# Patient Record
Sex: FEMALE | Race: WHITE | Employment: UNEMPLOYED | URBAN - METROPOLITAN AREA
[De-identification: names, ages, dates, MRNs, and addresses within clinical notes are randomized per-mention and may not be internally consistent; named-entity substitution may affect disease eponyms.]

---

## 2017-09-25 PROBLEM — E66.01 MORBID OBESITY WITH BMI OF 45.0-49.9, ADULT (HCC): Status: RESOLVED | Noted: 2017-03-22 | Resolved: 2017-09-25

## 2017-09-25 PROBLEM — K21.9 GASTROESOPHAGEAL REFLUX DISEASE: Status: ACTIVE | Noted: 2017-09-25

## 2017-09-25 PROBLEM — E66.01 MORBID OBESITY WITH BMI OF 50.0-59.9, ADULT (HCC): Status: ACTIVE | Noted: 2017-09-25

## 2018-07-26 DIAGNOSIS — F41.9 ANXIETY: ICD-10-CM

## 2018-07-27 RX ORDER — SERTRALINE HYDROCHLORIDE 25 MG/1
TABLET, FILM COATED ORAL
Qty: 30 TABLET | Refills: 5 | Status: SHIPPED | OUTPATIENT
Start: 2018-07-27 | End: 2018-10-29 | Stop reason: SDUPTHER

## 2018-10-23 ENCOUNTER — TELEPHONE (OUTPATIENT)
Dept: FAMILY MEDICINE CLINIC | Age: 38
End: 2018-10-23

## 2018-10-29 ENCOUNTER — OFFICE VISIT (OUTPATIENT)
Dept: FAMILY MEDICINE CLINIC | Age: 38
End: 2018-10-29
Payer: COMMERCIAL

## 2018-10-29 VITALS
SYSTOLIC BLOOD PRESSURE: 128 MMHG | HEIGHT: 67 IN | OXYGEN SATURATION: 96 % | WEIGHT: 293 LBS | HEART RATE: 76 BPM | BODY MASS INDEX: 45.99 KG/M2 | TEMPERATURE: 98 F | DIASTOLIC BLOOD PRESSURE: 86 MMHG

## 2018-10-29 DIAGNOSIS — K21.9 GASTROESOPHAGEAL REFLUX DISEASE, ESOPHAGITIS PRESENCE NOT SPECIFIED: ICD-10-CM

## 2018-10-29 DIAGNOSIS — F41.9 ANXIETY: Primary | ICD-10-CM

## 2018-10-29 PROCEDURE — 99213 OFFICE O/P EST LOW 20 MIN: CPT | Performed by: FAMILY MEDICINE

## 2018-10-29 PROCEDURE — 1036F TOBACCO NON-USER: CPT | Performed by: FAMILY MEDICINE

## 2018-10-29 PROCEDURE — G8484 FLU IMMUNIZE NO ADMIN: HCPCS | Performed by: FAMILY MEDICINE

## 2018-10-29 PROCEDURE — G8427 DOCREV CUR MEDS BY ELIG CLIN: HCPCS | Performed by: FAMILY MEDICINE

## 2018-10-29 PROCEDURE — G8417 CALC BMI ABV UP PARAM F/U: HCPCS | Performed by: FAMILY MEDICINE

## 2018-10-29 RX ORDER — SERTRALINE HYDROCHLORIDE 25 MG/1
TABLET, FILM COATED ORAL
Qty: 30 TABLET | Refills: 6 | Status: SHIPPED | OUTPATIENT
Start: 2018-10-29 | End: 2020-03-06 | Stop reason: SDUPTHER

## 2018-10-29 ASSESSMENT — PATIENT HEALTH QUESTIONNAIRE - PHQ9
1. LITTLE INTEREST OR PLEASURE IN DOING THINGS: 0
SUM OF ALL RESPONSES TO PHQ QUESTIONS 1-9: 1
SUM OF ALL RESPONSES TO PHQ9 QUESTIONS 1 & 2: 1
SUM OF ALL RESPONSES TO PHQ QUESTIONS 1-9: 1
2. FEELING DOWN, DEPRESSED OR HOPELESS: 1

## 2019-05-13 ENCOUNTER — HOSPITAL ENCOUNTER (OUTPATIENT)
Age: 39
Discharge: HOME | End: 2019-05-15
Payer: COMMERCIAL

## 2019-05-13 ENCOUNTER — OFFICE VISIT (OUTPATIENT)
Dept: PRIMARY CARE CLINIC | Age: 39
End: 2019-05-13
Payer: COMMERCIAL

## 2019-05-13 VITALS
TEMPERATURE: 98.5 F | WEIGHT: 293 LBS | BODY MASS INDEX: 50.28 KG/M2 | HEART RATE: 79 BPM | DIASTOLIC BLOOD PRESSURE: 86 MMHG | SYSTOLIC BLOOD PRESSURE: 118 MMHG | OXYGEN SATURATION: 98 %

## 2019-05-13 VITALS
SYSTOLIC BLOOD PRESSURE: 128 MMHG | TEMPERATURE: 97.8 F | WEIGHT: 293 LBS | DIASTOLIC BLOOD PRESSURE: 84 MMHG | HEIGHT: 67 IN | BODY MASS INDEX: 45.99 KG/M2

## 2019-05-13 DIAGNOSIS — M06.09 RHEUMATOID ARTHRITIS OF MULTIPLE SITES WITH NEGATIVE RHEUMATOID FACTOR (HCC): Primary | ICD-10-CM

## 2019-05-13 DIAGNOSIS — Z01.84 IMMUNITY STATUS TESTING: ICD-10-CM

## 2019-05-13 DIAGNOSIS — Z13.31 POSITIVE DEPRESSION SCREENING: ICD-10-CM

## 2019-05-13 DIAGNOSIS — Z13.220 SCREENING CHOLESTEROL LEVEL: ICD-10-CM

## 2019-05-13 DIAGNOSIS — K43.9 VENTRAL HERNIA WITHOUT OBSTRUCTION OR GANGRENE: ICD-10-CM

## 2019-05-13 DIAGNOSIS — K21.9 GASTROESOPHAGEAL REFLUX DISEASE, ESOPHAGITIS PRESENCE NOT SPECIFIED: ICD-10-CM

## 2019-05-13 DIAGNOSIS — E66.01 MORBID OBESITY (HCC): ICD-10-CM

## 2019-05-13 DIAGNOSIS — F41.9 ANXIETY: ICD-10-CM

## 2019-05-13 DIAGNOSIS — Z72.9 PROBLEM RELATED TO LIFESTYLE, UNSPECIFIED: ICD-10-CM

## 2019-05-13 PROCEDURE — 99214 OFFICE O/P EST MOD 30 MIN: CPT | Performed by: FAMILY MEDICINE

## 2019-05-13 PROCEDURE — 86787 VARICELLA-ZOSTER ANTIBODY: CPT

## 2019-05-13 PROCEDURE — G0444 DEPRESSION SCREEN ANNUAL: HCPCS | Performed by: FAMILY MEDICINE

## 2019-05-13 PROCEDURE — G8431 POS CLIN DEPRES SCRN F/U DOC: HCPCS | Performed by: FAMILY MEDICINE

## 2019-05-13 PROCEDURE — G8417 CALC BMI ABV UP PARAM F/U: HCPCS | Performed by: FAMILY MEDICINE

## 2019-05-13 PROCEDURE — G8427 DOCREV CUR MEDS BY ELIG CLIN: HCPCS | Performed by: FAMILY MEDICINE

## 2019-05-13 PROCEDURE — 36415 COLL VENOUS BLD VENIPUNCTURE: CPT

## 2019-05-13 PROCEDURE — 1036F TOBACCO NON-USER: CPT | Performed by: FAMILY MEDICINE

## 2019-05-13 RX ORDER — MELOXICAM 7.5 MG/1
7.5 TABLET ORAL
COMMUNITY
End: 2019-05-13 | Stop reason: ALTCHOICE

## 2019-05-13 ASSESSMENT — ENCOUNTER SYMPTOMS
COUGH: 0
CONSTIPATION: 0
BACK PAIN: 0
EYE REDNESS: 0
SHORTNESS OF BREATH: 0
ABDOMINAL PAIN: 1
WHEEZING: 0
RHINORRHEA: 0
DIARRHEA: 0
PHOTOPHOBIA: 0
SORE THROAT: 0
BLOOD IN STOOL: 0
NAUSEA: 0
VOMITING: 0

## 2019-05-13 ASSESSMENT — PATIENT HEALTH QUESTIONNAIRE - PHQ9
5. POOR APPETITE OR OVEREATING: 1
8. MOVING OR SPEAKING SO SLOWLY THAT OTHER PEOPLE COULD HAVE NOTICED. OR THE OPPOSITE, BEING SO FIGETY OR RESTLESS THAT YOU HAVE BEEN MOVING AROUND A LOT MORE THAN USUAL: 0
SUM OF ALL RESPONSES TO PHQ9 QUESTIONS 1 & 2: 4
1. LITTLE INTEREST OR PLEASURE IN DOING THINGS: 3
7. TROUBLE CONCENTRATING ON THINGS, SUCH AS READING THE NEWSPAPER OR WATCHING TELEVISION: 0
10. IF YOU CHECKED OFF ANY PROBLEMS, HOW DIFFICULT HAVE THESE PROBLEMS MADE IT FOR YOU TO DO YOUR WORK, TAKE CARE OF THINGS AT HOME, OR GET ALONG WITH OTHER PEOPLE: 0
3. TROUBLE FALLING OR STAYING ASLEEP: 3
6. FEELING BAD ABOUT YOURSELF - OR THAT YOU ARE A FAILURE OR HAVE LET YOURSELF OR YOUR FAMILY DOWN: 3
SUM OF ALL RESPONSES TO PHQ QUESTIONS 1-9: 12
4. FEELING TIRED OR HAVING LITTLE ENERGY: 1
2. FEELING DOWN, DEPRESSED OR HOPELESS: 1
9. THOUGHTS THAT YOU WOULD BE BETTER OFF DEAD, OR OF HURTING YOURSELF: 0
SUM OF ALL RESPONSES TO PHQ QUESTIONS 1-9: 12

## 2019-05-13 NOTE — PROGRESS NOTES
2019    Chief Complaint   Patient presents with    Annual Exam     dx with RA 1yr ago         HPI  Radha Clemons (:  1980) is a 45 y.o. female, here for evaluation of the following medical concerns:    Patient is a 29-year-old female with a past medical history of anxiety, GERD, obesity, rheumatoid arthritis    RA: Patient follows with rheumatology, Dr. Tami Lee. Patient is currently on plaquenil and has regular eye exams due to being on this medication. RF:49 and . Reports that her symptoms are under moderately well controlled with her current medication regimen. Anxiety: Patient has been well controlled on Zoloft 25 mg daily. Patient has any SI/HI. Does not follow with a therapist. Higinio Kevin through a divorce. To do this report some increasing anxiety/depression. GERD: No longer take omeprazole. Avoiding her triggers. No worsening of her GERD symptoms. Obesity: Patient does not regularly follow a diet or exercise regimen. Requesting referral to bariatric's. Does report that she has been walking more regularly and lost a few pounds over the last few weeks. GYN/OB: Needs a new ob/gyn physician. PAP smear about a year and a half ago. No hx of ab on PAP. Ventral Hernia: Reports mild pain around her umbillicus at times. Was told in the past she does have a hernia. Requesting add work-up/mngt. HM: Pap smear due?, Vz immunization due, HIV screening due. No recent lipid panel    Review of Systems   Constitutional: Negative for chills and fever. HENT: Negative for congestion, hearing loss, postnasal drip, rhinorrhea, sneezing, sore throat and tinnitus. Eyes: Negative for photophobia and redness. Respiratory: Negative for cough, shortness of breath and wheezing. Cardiovascular: Negative for chest pain, palpitations and leg swelling. Gastrointestinal: Positive for abdominal pain. Negative for blood in stool, constipation, diarrhea, nausea and vomiting.    Endocrine: Negative for polydipsia and polyuria. Genitourinary: Negative for difficulty urinating, dysuria, hematuria, vaginal bleeding and vaginal discharge. Musculoskeletal: Positive for arthralgias. Negative for back pain. Skin: Negative for rash. Neurological: Negative for dizziness, weakness, light-headedness, numbness and headaches. Psychiatric/Behavioral: Negative for suicidal ideas. The patient is nervous/anxious. Prior to Visit Medications    Medication Sig Taking? Authorizing Provider   sertraline (ZOLOFT) 25 MG tablet take 1 tablet by mouth once daily  Katie Lutz DO   hydroxychloroquine (PLAQUENIL) 200 MG tablet Take 200 mg by mouth 2 times daily  Historical Provider, MD   folic acid (FOLVITE) 1 MG tablet Take 1 mg by mouth daily  Historical Provider, MD   meloxicam (MOBIC) 15 MG tablet   Historical Provider, MD        Allergies   Allergen Reactions    Ceclor [Cefaclor]      UNKNOWN REACTION    Codeine Hives    Erythromycin Rash    Tylenol 8 Hour [Acetaminophen] Hives     Tylenol #3 with Codeine       Past Medical History:   Diagnosis Date    Anxiety     Depression     Hernia of abdominal cavity     [facility]     Rheumatoid arthritis ([facility] 2018        Menstrual History:  OB History    None          No LMP recorded.          Past Surgical History:   Procedure Laterality Date    ANKLE FRACTURE SURGERY      has plate and pins    CHOLECYSTECTOMY      TUBAL LIGATION         Family History   Problem Relation Age of Onset    Hypertension Mother        Immunization History   Administered Date(s) Administered    Influenza, Quadv, 3 yrs and older, IM, PF (Fluzone 3 yrs and older or Afluria 5 yrs and older) 09/25/2017    Tdap (Boostrix, Adacel) 10/25/2017       Health Maintenance   Topic Date Due    Varicella Vaccine (1 of 2 - 13+ 2-dose series) 09/29/1993    HIV screen  09/29/1995    Cervical cancer screen  04/27/2018    Flu vaccine (Season Ended) 09/01/2019    DTaP/Tdap/Td vaccine (2 - Td) 10/25/2027    Pneumococcal 0-64 years Vaccine  Aged Out       Social History     Socioeconomic History    Marital status:      Spouse name: Not on file    Number of children: Not on file    Years of education: Not on file    Highest education level: Not on file   Occupational History    Not on file   Social Needs    Financial resource strain: Not on file    Food insecurity:     Worry: Not on file     Inability: Not on file    Transportation needs:     Medical: Not on file     Non-medical: Not on file   Tobacco Use    Smoking status: Former Smoker     Last attempt to quit: 2013     Years since quittin.0    Smokeless tobacco: Never Used   Substance and Sexual Activity    Alcohol use: No    Drug use: No    Sexual activity: Not on file   Lifestyle    Physical activity:     Days per week: Not on file     Minutes per session: Not on file    Stress: Not on file   Relationships    Social connections:     Talks on phone: Not on file     Gets together: Not on file     Attends Muslim service: Not on file     Active member of club or organization: Not on file     Attends meetings of clubs or organizations: Not on file     Relationship status: Not on file    Intimate partner violence:     Fear of current or ex partner: Not on file     Emotionally abused: Not on file     Physically abused: Not on file     Forced sexual activity: Not on file   Other Topics Concern    Not on file   Social History Narrative    Not on file         Vitals:    19 1516   BP: 128/84   Temp: 97.8 °F (36.6 °C)   Weight: (!) 328 lb (148.8 kg)   Height: 5' 7\" (1.702 m)       Estimated body mass index is 51.37 kg/m² as calculated from the following:    Height as of this encounter: 5' 7\" (1.702 m). Weight as of this encounter: 328 lb (148.8 kg). Physical Exam   Constitutional: She is oriented to person, place, and time. She appears well-developed and well-nourished.    HENT: Head: Normocephalic. Right Ear: External ear normal.   Left Ear: External ear normal.   Mouth/Throat: Oropharynx is clear and moist. No oropharyngeal exudate. Eyes: Pupils are equal, round, and reactive to light. Conjunctivae and EOM are normal.   Neck: Trachea normal. Neck supple. No thyromegaly present. Cardiovascular: Normal rate, regular rhythm, normal heart sounds and intact distal pulses. No murmur heard. Pulses:       Radial pulses are 2+ on the right side, and 2+ on the left side. Posterior tibial pulses are 2+ on the right side, and 2+ on the left side. Pulmonary/Chest: Effort normal and breath sounds normal. No respiratory distress. She has no decreased breath sounds. She has no wheezes. She has no rales. Abdominal: Soft. Bowel sounds are normal. She exhibits no distension. There is tenderness in the periumbilical area. There is no rebound. Exam limited secondary to body habitus. Possible ventral hernia versus diastasis recti. Musculoskeletal: Normal range of motion. She exhibits no edema. Bilateral lower extremity mild lymphedema   Lymphadenopathy:     She has no cervical adenopathy. Neurological: She is alert and oriented to person, place, and time. She has normal strength. No cranial nerve deficit or sensory deficit. Reflex Scores:       Patellar reflexes are 1+ on the right side and 1+ on the left side. Skin: Skin is warm and dry. No rash noted. Psychiatric: She has a normal mood and affect. Her behavior is normal.   Nursing note and vitals reviewed.       Patient Active Problem List    Diagnosis Date Noted    Gastroesophageal reflux disease 10/29/2018    Morbid obesity with BMI of 50.0-59.9, adult ([facility] [address] 09/25/2017    Gastroesophageal reflux disease 09/25/2017    Rheumatoid arthritis of multiple sites with negative rheumatoid factor ([facility] [address] [facility] 03/22/2017    Elevated uric acid in blood 09/19/2016    PRUDENCIO positive 09/19/2016    Anxiety          ASSESSMENT/PLAN:  [facility] was [facility] today for annual exam.    Diagnoses and all orders for this visit:    Rheumatoid arthritis of multiple sites with negative rheumatoid factor [facility])  Patient to rheumatology. Continue current regimen. Anxiety  Patient reports mild worsening of her symptoms secondary to a ongoing divorce. No SI/HI. Patient tolerating her current medication well. No desire to increase her dosage at this time. Gastroesophageal reflux disease, esophagitis presence not specified  Controlled via diet. Not currently on any medications for such. Positive depression screening  -     Positive Screen for Clinical Depression with a Documented Follow-up Plan   Treatment as noted above. Morbid obesity (HCC)  -     Nidhi Abel MD, [facility]  -     TSH without Reflex; Future  -     Lipid Panel; Future  -     Hemoglobin A1C; Future  Discussed lifestyle modifications. Referral and lipids as noted above. Screening cholesterol level  -     Lipid Panel; Future    Ventral hernia without obstruction or gangrene  -     US ABDOMEN LIMITED; Future  -     [address], MD, General Surgery, [facility]  Recent worsening symptoms with mild pain at times. Check ultrasound. Consider possible CT if no findings on ultrasound. Follow up with surgery. Immunity status testing  -     Varicella Zoster Antibody, IgG; Future  -     Varicella Zoster Antibody, IgM; Future  In the patient's health maintenance issues there is suggestion that she is not immune to varicella. We'll check as noted above. If titers are low we will proceed with varicella immunization. Problem related to lifestyle, unspecified  -     Jeri Quiroz MD, OB/GYN, [facility]  Referral to [facility] care for routine Pap smears and future mammograms. Health Maintenance reviewed     Return in about 3 months (around 8/13/2019).       Educational materials and/or home exercises printed for patient's review and were included in patient instructions on his/her After Visit Summary and given to patient at the end of visit.       Counseled regarding above diagnosis, including possible risks and complications,  especially if left uncontrolled.     Counseled regarding the possible side effects, risks, benefits and alternatives to treatment; patient and/or guardianverbalizes understanding, agrees, feels comfortable with and wishes to proceed with above treatment plan.     Advised patient to call with any new medication issues, and read all Rx info from pharmacy to assure aware of all possible risks and side effects of medication before taking.     Reviewed age and gender appropriate health screening exams and vaccinations. Advised patient regarding importance of keeping up withrecommended health maintenance and to schedule as soon as possible if overdue, as this is important in assessing for undiagnosed pathology, especially cancer, as well as protecting against potentially harmful/lifethreatening disease.       Patient and/or guardian verbalizes understanding and agrees with abovecounseling, assessment and plan.     All questions answered. An  electronic signature was used to authenticatethis note. --Lurdes Mireles MD on 5/13/19 at 3:12 PM    On the basis of positive PHQ-9 screening (PHQ-9 Total Score: 12), the following plan was implemented: Patient already on medication and seeing therapist.  Patient will follow-up in 3 month(s) with PCP.

## 2019-05-14 ENCOUNTER — TELEPHONE (OUTPATIENT)
Dept: SURGERY | Age: 39
End: 2019-05-14

## 2019-05-14 LAB — VARICELLA-ZOSTER VIRUS AB, IGG: NORMAL

## 2019-05-16 ENCOUNTER — TELEPHONE (OUTPATIENT)
Dept: PRIMARY CARE CLINIC | Age: 39
End: 2019-05-16

## 2019-05-16 LAB — VARICELLA ZOSTER AB IGM: 0.43 ISR

## 2019-05-16 NOTE — TELEPHONE ENCOUNTER
----- Message from Crissy Calderón MD sent at 5/15/2019  8:18 AM EDT -----  Please notify the patient that she is immune to chickenpox.

## 2019-05-16 NOTE — TELEPHONE ENCOUNTER
----- Message from Kristian Slater MD sent at 5/15/2019  8:18 AM EDT -----  Please notify the patient that she is immune to chickenpox.

## 2019-05-31 ENCOUNTER — OFFICE VISIT (OUTPATIENT)
Dept: SURGERY | Age: 39
End: 2019-05-31
Payer: COMMERCIAL

## 2019-05-31 ENCOUNTER — TELEPHONE (OUTPATIENT)
Dept: SURGERY | Age: 39
End: 2019-05-31

## 2019-05-31 VITALS
HEART RATE: 65 BPM | OXYGEN SATURATION: 93 % | WEIGHT: 293 LBS | HEIGHT: 67 IN | BODY MASS INDEX: 45.99 KG/M2 | DIASTOLIC BLOOD PRESSURE: 70 MMHG | TEMPERATURE: 98.4 F | SYSTOLIC BLOOD PRESSURE: 130 MMHG | RESPIRATION RATE: 14 BRPM

## 2019-05-31 DIAGNOSIS — K43.9 EPIGASTRIC HERNIA: Primary | ICD-10-CM

## 2019-05-31 PROCEDURE — 1036F TOBACCO NON-USER: CPT | Performed by: SURGERY

## 2019-05-31 PROCEDURE — 99203 OFFICE O/P NEW LOW 30 MIN: CPT | Performed by: SURGERY

## 2019-05-31 PROCEDURE — G8417 CALC BMI ABV UP PARAM F/U: HCPCS | Performed by: SURGERY

## 2019-05-31 PROCEDURE — G8427 DOCREV CUR MEDS BY ELIG CLIN: HCPCS | Performed by: SURGERY

## 2019-05-31 NOTE — PROGRESS NOTES
[facility] Note    Assessment/Plan:      Diagnosis Orders   1. Epigastric hernia  CT ABDOMEN PELVIS W IV CONTRAST Additional Contrast? Oral    BASIC METABOLIC PANEL         Return for Results. Chief Complaint   Patient presents with    Hernia     ventrial hernia referred by Dr. Ronal Ferrer. states it is located on the abdomen area. states shes had it for years. pt states she has pain. pt states she is eating and drinking okay. PCP: Haven Hughes MD    HPI: Savanah Gomez is a 45 y.o. female who presents in consultation for hernia. She has had this for a few years. It is in the epigastrium. It does cause some sharp pain 3-4x daily. . She had an ultrasound which showed a hernia about 2 cm above the umbilicus. There is also evidence for diastases recti. We discussed with her BMI being greater than 52, she is at very high risk for complications as well as a high risk of hernia recurrence. Past Medical History:   Diagnosis Date    Anxiety     Depression     Hernia of abdominal cavity     [facility] ([facility] [address] [facility]     Rheumatoid arthritis ([facility] [address] 2018       Past Surgical History:   Procedure Laterality Date    ANKLE FRACTURE SURGERY      has plate and pins    CHOLECYSTECTOMY      TUBAL LIGATION         Prior to Admission medications    Medication Sig Start Date End Date Taking?  Authorizing Provider   sertraline (ZOLOFT) 25 MG tablet take 1 tablet by mouth once daily 10/29/18  Yes Zay Galvan DO   hydroxychloroquine (PLAQUENIL) 200 MG tablet Take 200 mg by mouth 2 times daily   Yes Historical Provider, MD   folic acid (FOLVITE) 1 MG tablet Take 1 mg by mouth daily   Yes Historical Provider, MD   meloxicam (MOBIC) 15 MG tablet  1/17/17  Yes Historical Provider, MD       Allergies   Allergen Reactions    Ceclor [Cefaclor]      UNKNOWN REACTION    Codeine Hives    Erythromycin Rash    Tylenol 8 Hour [Acetaminophen] Hives     Tylenol #3 with Codeine       Social No respiratory distress. Abdominal: Soft. She exhibits no distension. There is no tenderness. There is no rebound and no guarding. Morbidly obese. Unable to appreciate hernia due to habitus. Neurological: She is alert and oriented to person, place, and time. Skin: Skin is warm and dry. Kiana Llaons MD  6/3/2019    NOTE: This report, in part or full,may have been transcribed using voice recognition software. Every effort was made to ensure accuracy; however, inadvertent computerized transcription errors may be present. Please excuse any transcriptional grammatical or spelling errors that may have escaped my editorial review.     CC: Ru Esparza MD

## 2019-05-31 NOTE — TELEPHONE ENCOUNTER
MA called [facility] to get CT of the abdomen/pelvis w/contrast approved. CPT code 52181. MA talked to a Olivia Cardona. And he stated that a prior authorization is needed for the CT of the abdomen/pelvis w/contrast. Lina STEINER Stated that he needed clinicals/notes pertaining to the CT scan faxed to [phone] to the [facility] department.     Case number: [id number]    Electronically signed by Blanche Villegas on 5/31/19 at 1:43 PM

## 2019-06-03 ENCOUNTER — TELEPHONE (OUTPATIENT)
Dept: SURGERY | Age: 39
End: 2019-06-03

## 2019-06-03 NOTE — TELEPHONE ENCOUNTER
MA called [facility] to check on the clinicals/notes that were sent over to them if they have received them. MA talked to lb CATES [employer] and she stated she needed the exact area of where the hernia was located. MA stated in the notes it said \"above the umbilicus. \" Eva Romero Stated that they can approve a CT abdomen/pelvis w/contrast or a CT Abdomen/pelvis w/o contrast but they cannot approve a CT of abdomen in the pelvis. Kira CATES Stated that the prior authorization should be done by 6/4/19. We would get an automatic fax from [employer] giving us an answer on whether it was approved or not. Kira CATES Advised we get a decision on redirection from Dr. Sera Horn and to let them know what Dr. Sera Horn stated.     Electronically signed by Angel Navarrete on 6/3/19 at 3:21 PM

## 2019-06-04 ENCOUNTER — TELEPHONE (OUTPATIENT)
Dept: SURGERY | Age: 39
End: 2019-06-04

## 2019-06-04 NOTE — TELEPHONE ENCOUNTER
MA called the pt to schedule CT of the abdomen/pelvis with IV contrast and oral and to also get a BMP drawn for blood work that Dr. Lexus Wells had ordered. Pt stated that any day the week of the 10th of June or Thursday June 6th or Friday June 7th would work for her. MA called Radiology scheduling and put the pt on for June 6th at 3:30 pm. MA called the pt back to give the appointment time for the CT and blood work. MA expllained to the pt NPO four hours prior to the CT and to arrive at the [facility] in [facility] before 1:00 or 1:30 to get the BMP drawn and to get over to Registration for the CT before 2:30. MA explained to the pt she can take her regular medications with water and for her to bring photo id, ins card, and list of medications. MA also made a follow up appointment for the CT scan and blood work results for Friday June 14th at 10:45 am at the [place] office location.     Electronically signed by Henry Rodriguez on 6/4/19 at 10:32 AM

## 2019-06-10 ENCOUNTER — TELEPHONE (OUTPATIENT)
Dept: SURGERY | Age: 39
End: 2019-06-10

## 2019-06-10 NOTE — TELEPHONE ENCOUNTER
CARMENCITA Reagan tried 2 times to call patient to see why she no showed for her CT appt and if she wanted to reschedule, however, her phone number was not working.     Electronically signed by Moe Escalante MA on 6/10/2019 at 1:30 PM

## 2019-11-18 ENCOUNTER — OFFICE VISIT (OUTPATIENT)
Dept: FAMILY MEDICINE CLINIC | Age: 39
End: 2019-11-18
Payer: MEDICAID

## 2019-11-18 VITALS
TEMPERATURE: 98.5 F | OXYGEN SATURATION: 98 % | HEART RATE: 80 BPM | SYSTOLIC BLOOD PRESSURE: 130 MMHG | WEIGHT: 293 LBS | BODY MASS INDEX: 45.99 KG/M2 | HEIGHT: 67 IN | DIASTOLIC BLOOD PRESSURE: 80 MMHG

## 2019-11-18 DIAGNOSIS — H66.002 NON-RECURRENT ACUTE SUPPURATIVE OTITIS MEDIA OF LEFT EAR WITHOUT SPONTANEOUS RUPTURE OF TYMPANIC MEMBRANE: ICD-10-CM

## 2019-11-18 DIAGNOSIS — J01.90 ACUTE NON-RECURRENT SINUSITIS, UNSPECIFIED LOCATION: Primary | ICD-10-CM

## 2019-11-18 PROCEDURE — G8427 DOCREV CUR MEDS BY ELIG CLIN: HCPCS | Performed by: FAMILY MEDICINE

## 2019-11-18 PROCEDURE — 99213 OFFICE O/P EST LOW 20 MIN: CPT | Performed by: FAMILY MEDICINE

## 2019-11-18 PROCEDURE — G8417 CALC BMI ABV UP PARAM F/U: HCPCS | Performed by: FAMILY MEDICINE

## 2019-11-18 PROCEDURE — G8484 FLU IMMUNIZE NO ADMIN: HCPCS | Performed by: FAMILY MEDICINE

## 2019-11-18 PROCEDURE — 1036F TOBACCO NON-USER: CPT | Performed by: FAMILY MEDICINE

## 2019-11-18 RX ORDER — AMOXICILLIN AND CLAVULANATE POTASSIUM 875; 125 MG/1; MG/1
1 TABLET, FILM COATED ORAL 2 TIMES DAILY
Qty: 20 TABLET | Refills: 0 | Status: SHIPPED | OUTPATIENT
Start: 2019-11-18 | End: 2019-11-28

## 2019-11-18 RX ORDER — GUAIFENESIN 600 MG/1
1200 TABLET, EXTENDED RELEASE ORAL 2 TIMES DAILY
Qty: 40 TABLET | Refills: 0 | Status: SHIPPED | OUTPATIENT
Start: 2019-11-18 | End: 2019-11-28

## 2019-11-18 ASSESSMENT — ENCOUNTER SYMPTOMS
CONSTIPATION: 0
CHEST TIGHTNESS: 0
WHEEZING: 0
COUGH: 1
SINUS PAIN: 1
SORE THROAT: 1
RHINORRHEA: 1
BACK PAIN: 0
SHORTNESS OF BREATH: 0
EYE PAIN: 0
TROUBLE SWALLOWING: 0
DIARRHEA: 0
ABDOMINAL PAIN: 0
VOMITING: 0
NAUSEA: 0
SINUS PRESSURE: 1

## 2019-12-04 ENCOUNTER — OFFICE VISIT (OUTPATIENT)
Dept: PRIMARY CARE CLINIC | Age: 39
End: 2019-12-04
Payer: MEDICAID

## 2019-12-04 ENCOUNTER — HOSPITAL ENCOUNTER (OUTPATIENT)
Age: 39
Discharge: HOME | End: 2019-12-06
Payer: MEDICAID

## 2019-12-04 VITALS
HEART RATE: 88 BPM | HEIGHT: 66 IN | TEMPERATURE: 98.8 F | DIASTOLIC BLOOD PRESSURE: 70 MMHG | RESPIRATION RATE: 16 BRPM | SYSTOLIC BLOOD PRESSURE: 120 MMHG | WEIGHT: 293 LBS | OXYGEN SATURATION: 94 % | BODY MASS INDEX: 47.09 KG/M2

## 2019-12-04 DIAGNOSIS — Z13.1 ENCOUNTER FOR SCREENING EXAMINATION FOR IMPAIRED GLUCOSE REGULATION AND DIABETES MELLITUS: ICD-10-CM

## 2019-12-04 DIAGNOSIS — E66.01 MORBID OBESITY WITH BMI OF 45.0-49.9, ADULT (HCC): ICD-10-CM

## 2019-12-04 DIAGNOSIS — Z23 NEED FOR INFLUENZA VACCINATION: Primary | ICD-10-CM

## 2019-12-04 DIAGNOSIS — Z13.220 SCREENING CHOLESTEROL LEVEL: ICD-10-CM

## 2019-12-04 DIAGNOSIS — Z72.9 LIFESTYLE PROBLEMS: ICD-10-CM

## 2019-12-04 DIAGNOSIS — F41.9 ANXIETY: ICD-10-CM

## 2019-12-04 DIAGNOSIS — K21.9 GASTROESOPHAGEAL REFLUX DISEASE, ESOPHAGITIS PRESENCE NOT SPECIFIED: ICD-10-CM

## 2019-12-04 DIAGNOSIS — K43.9 VENTRAL HERNIA WITHOUT OBSTRUCTION OR GANGRENE: ICD-10-CM

## 2019-12-04 DIAGNOSIS — J00 NASOPHARYNGITIS: ICD-10-CM

## 2019-12-04 DIAGNOSIS — M06.09 RHEUMATOID ARTHRITIS OF MULTIPLE SITES WITH NEGATIVE RHEUMATOID FACTOR (HCC): ICD-10-CM

## 2019-12-04 LAB
ALBUMIN SERPL-MCNC: 4.2 G/DL (ref 3.5–5.2)
ALP BLD-CCNC: 91 U/L (ref 35–104)
ALT SERPL-CCNC: 12 U/L (ref 0–32)
ANION GAP SERPL CALCULATED.3IONS-SCNC: 14 MMOL/L (ref 7–16)
AST SERPL-CCNC: 12 U/L (ref 0–31)
BILIRUB SERPL-MCNC: 0.4 MG/DL (ref 0–1.2)
BUN BLDV-MCNC: 14 MG/DL (ref 6–20)
CALCIUM SERPL-MCNC: 9.6 MG/DL (ref 8.6–10.2)
CHLORIDE BLD-SCNC: 108 MMOL/L (ref 98–107)
CHOLESTEROL, TOTAL: 149 MG/DL (ref 0–199)
CO2: 21 MMOL/L (ref 22–29)
CREAT SERPL-MCNC: 0.8 MG/DL (ref 0.5–1)
GFR AFRICAN AMERICAN: >60
GFR NON-AFRICAN AMERICAN: >60 ML/MIN/1.73
GLUCOSE BLD-MCNC: 102 MG/DL (ref 74–99)
HBA1C MFR BLD: 5.1 % (ref 4–5.6)
HCT VFR BLD CALC: 48.7 % (ref 34–48)
HDLC SERPL-MCNC: 32 MG/DL
HEMOGLOBIN: 14.8 G/DL (ref 11.5–15.5)
LDL CHOLESTEROL CALCULATED: 95 MG/DL (ref 0–99)
MCH RBC QN AUTO: 26.5 PG (ref 26–35)
MCHC RBC AUTO-ENTMCNC: 30.4 % (ref 32–34.5)
MCV RBC AUTO: 87.1 FL (ref 80–99.9)
PDW BLD-RTO: 13.6 FL (ref 11.5–15)
PLATELET # BLD: 331 E9/L (ref 130–450)
PMV BLD AUTO: 9.9 FL (ref 7–12)
POTASSIUM SERPL-SCNC: 4.6 MMOL/L (ref 3.5–5)
RBC # BLD: 5.59 E12/L (ref 3.5–5.5)
SEDIMENTATION RATE, ERYTHROCYTE: 25 MM/HR (ref 0–20)
SODIUM BLD-SCNC: 143 MMOL/L (ref 132–146)
TOTAL PROTEIN: 7.7 G/DL (ref 6.4–8.3)
TRIGL SERPL-MCNC: 109 MG/DL (ref 0–149)
TSH SERPL DL<=0.05 MIU/L-ACNC: 1.55 UIU/ML (ref 0.27–4.2)
VLDLC SERPL CALC-MCNC: 22 MG/DL
WBC # BLD: 8.4 E9/L (ref 4.5–11.5)

## 2019-12-04 PROCEDURE — G8417 CALC BMI ABV UP PARAM F/U: HCPCS | Performed by: FAMILY MEDICINE

## 2019-12-04 PROCEDURE — 80061 LIPID PANEL: CPT

## 2019-12-04 PROCEDURE — 85651 RBC SED RATE NONAUTOMATED: CPT

## 2019-12-04 PROCEDURE — 85027 COMPLETE CBC AUTOMATED: CPT

## 2019-12-04 PROCEDURE — G8482 FLU IMMUNIZE ORDER/ADMIN: HCPCS | Performed by: FAMILY MEDICINE

## 2019-12-04 PROCEDURE — 36415 COLL VENOUS BLD VENIPUNCTURE: CPT

## 2019-12-04 PROCEDURE — 90686 IIV4 VACC NO PRSV 0.5 ML IM: CPT | Performed by: FAMILY MEDICINE

## 2019-12-04 PROCEDURE — 99214 OFFICE O/P EST MOD 30 MIN: CPT | Performed by: FAMILY MEDICINE

## 2019-12-04 PROCEDURE — 84443 ASSAY THYROID STIM HORMONE: CPT

## 2019-12-04 PROCEDURE — G8427 DOCREV CUR MEDS BY ELIG CLIN: HCPCS | Performed by: FAMILY MEDICINE

## 2019-12-04 PROCEDURE — 83036 HEMOGLOBIN GLYCOSYLATED A1C: CPT

## 2019-12-04 PROCEDURE — 80053 COMPREHEN METABOLIC PANEL: CPT

## 2019-12-04 PROCEDURE — 90471 IMMUNIZATION ADMIN: CPT | Performed by: FAMILY MEDICINE

## 2019-12-04 PROCEDURE — 1036F TOBACCO NON-USER: CPT | Performed by: FAMILY MEDICINE

## 2019-12-04 RX ORDER — BENZONATATE 100 MG/1
100 CAPSULE ORAL 2 TIMES DAILY PRN
Qty: 20 CAPSULE | Refills: 0 | Status: SHIPPED | OUTPATIENT
Start: 2019-12-04 | End: 2019-12-11

## 2019-12-04 RX ORDER — AZITHROMYCIN 250 MG/1
250 TABLET, FILM COATED ORAL SEE ADMIN INSTRUCTIONS
Qty: 6 TABLET | Refills: 0 | Status: SHIPPED | OUTPATIENT
Start: 2019-12-04 | End: 2019-12-09

## 2019-12-04 ASSESSMENT — ENCOUNTER SYMPTOMS
RHINORRHEA: 1
SORE THROAT: 0
WHEEZING: 0
ABDOMINAL PAIN: 0
CONSTIPATION: 0
NAUSEA: 0
DIARRHEA: 0
SHORTNESS OF BREATH: 0
VOMITING: 0

## 2020-01-22 ENCOUNTER — TELEPHONE (OUTPATIENT)
Dept: PRIMARY CARE CLINIC | Age: 40
End: 2020-01-22

## 2020-03-09 ENCOUNTER — TELEPHONE (OUTPATIENT)
Dept: OBGYN | Age: 40
End: 2020-03-09

## 2020-03-09 RX ORDER — SERTRALINE HYDROCHLORIDE 25 MG/1
TABLET, FILM COATED ORAL
Qty: 30 TABLET | Refills: 3 | Status: SHIPPED
Start: 2020-03-09 | End: 2020-08-10 | Stop reason: SDUPTHER

## 2020-03-09 NOTE — TELEPHONE ENCOUNTER
Called patient to cancel/reschedule appointment for 3/12/20 d/t Dr. Jared Hernandez having to perform surgery.  No answer, and unable to leave a message on voicemail    -CBrantley, 3/9/20

## 2020-04-01 ENCOUNTER — VIRTUAL VISIT (OUTPATIENT)
Dept: PRIMARY CARE CLINIC | Age: 40
End: 2020-04-01
Payer: MEDICAID

## 2020-04-01 PROBLEM — R22.2 MASS ON BACK: Status: ACTIVE | Noted: 2020-04-01

## 2020-04-01 PROCEDURE — 99443 PR PHYS/QHP TELEPHONE EVALUATION 21-30 MIN: CPT | Performed by: FAMILY MEDICINE

## 2020-04-01 RX ORDER — HYDROXYCHLOROQUINE SULFATE 200 MG/1
200 TABLET, FILM COATED ORAL 2 TIMES DAILY
Qty: 90 TABLET | Refills: 2 | Status: SHIPPED
Start: 2020-04-01 | End: 2020-06-09 | Stop reason: SDUPTHER

## 2020-04-01 ASSESSMENT — ENCOUNTER SYMPTOMS
CONSTIPATION: 0
VOMITING: 0
WHEEZING: 0
ABDOMINAL PAIN: 1
DIARRHEA: 0
SHORTNESS OF BREATH: 0
RHINORRHEA: 0
NAUSEA: 0

## 2020-04-01 ASSESSMENT — PATIENT HEALTH QUESTIONNAIRE - PHQ9
SUM OF ALL RESPONSES TO PHQ QUESTIONS 1-9: 0
2. FEELING DOWN, DEPRESSED OR HOPELESS: 0
SUM OF ALL RESPONSES TO PHQ QUESTIONS 1-9: 0
1. LITTLE INTEREST OR PLEASURE IN DOING THINGS: 0
SUM OF ALL RESPONSES TO PHQ9 QUESTIONS 1 & 2: 0

## 2020-04-01 NOTE — PROGRESS NOTES
modifications discussed. Mass on back  Most likely a sebaceous cyst versus a lipoma. Patient was advised that there are no concerning symptoms at this time that would necessitate imaging or surgical procedure. Patient was advised on concerning symptoms that would necessitate a possible referral back to Gen. surgery and/or imaging. Patient will follow-up for all of her above issues in approximately 2-4 weeks. Patient will need to complete a CT of her abdomen and pelvis prior to such. We will consider restarting her on methotrexate 2.5 mg daily along with folic acid 1 mg daily at her follow-up appointment. She may be scheduled for a repeat telephone the visit. Greater than 21 minutes was spent on the above issues and office appointment via a telephone electronic visit. Greater than 50% of the time was spent counseling and advising the patient on her above medical problems. Return in about 4 weeks (around 4/29/2020). An [employer]ignature was used to authenticate this note.     --Sakina Rodriguez MD on 4/1/20 at 1:20 PM EDT

## 2020-04-27 ENCOUNTER — VIRTUAL VISIT (OUTPATIENT)
Dept: PRIMARY CARE CLINIC | Age: 40
End: 2020-04-27
Payer: MEDICAID

## 2020-04-27 ENCOUNTER — TELEPHONE (OUTPATIENT)
Dept: FAMILY MEDICINE CLINIC | Age: 40
End: 2020-04-27

## 2020-04-27 PROBLEM — M05.79 RHEUMATOID ARTHRITIS INVOLVING MULTIPLE SITES WITH POSITIVE RHEUMATOID FACTOR (HCC): Status: ACTIVE | Noted: 2017-03-22

## 2020-04-27 PROCEDURE — 99214 OFFICE O/P EST MOD 30 MIN: CPT | Performed by: FAMILY MEDICINE

## 2020-04-27 ASSESSMENT — ENCOUNTER SYMPTOMS
COUGH: 0
ABDOMINAL PAIN: 0
DIARRHEA: 0
CONSTIPATION: 0
SHORTNESS OF BREATH: 0

## 2020-04-27 NOTE — PROGRESS NOTES
2020     [facility]  Jeffrey David (:  1980) is a 44 y.o. female, here for evaluation of the following medical concerns: The below office appointment was obtained via a telephone e-visit.     RA: Patient used to follow with rheumatology, Dr. Bettye Sanchez reports she was discharged from this practice. Patient had a new referral placed to rheumatology after her last appointment. However due to the recent health concern and insurance issues she has been unable to make an appointment. Patient is currently on plaquenil and has regular eye exams due to being on this medication (q 6 months). Patient reports that she has been on this medication for approximately 2-3 years. RF:49 and . In the past the patient was on methotrexate 2.5 mg daily and folate 1 mg daily. Patient also takes mobic 7.5mg daily. On SE from Methotrexate the patient had in the past was hair loss. Besides this patient reports she tolerated medication well. Patient is requesting to be placed back on this medication until she is able to follow-up with her rheumatologist.    Brisa Pham has been well controlled on Zoloft 25 mg daily. Patient has any SI/HI. Does not follow with a therapist.     Obesity: Patient does not regularly follow a diet or exercise regimen. Was referred to bariatrics.      Ventral Hernia: Referred to surgery. Did not complete CT orders after establishing with them.  patient reported that she had developed worsening abdominal pain overlying her ventral hernia at her last apt. A new CT was ordered but she was unable to complete due to COVID-19. Patient reports she has not had any issues with her hernia in several days.     Back Mass: Patient reports that she discovered this mass in between her shoulder blades approximately 6 months ago. No recent change. About the size of a golf ball. Nontender. Mobile. Firm. No redness or pain on palpation. Review of Systems   Constitutional: Negative for fever. Future  -     Comprehensive Metabolic Panel; Future  -     Cyclic Citrul Peptide Antibody, IgG; Future  -     XR CHEST STANDARD (2 VW); Future  -     Mantoux testing  -     Hepatitis Panel, Acute; Future  -     HEPATITIS B SURFACE ANTIBODY; Future  -     XR HAND LEFT (MIN 3 VIEWS); Future  -     XR HAND RIGHT (MIN 3 VIEWS); Future  -     Rheumatoid Factor; Future  -     C-Reactive Protein; Future  Unable to see Rhuem. Will take over care temp. Labs as noted above. Will plan on restarting MTX 7.4TB with Folic acid 1mg at next apt in 1 week as long as labs are ok. Will need f/u labs every 2-4 weeks for up to 3 months then we may space to q 3 months as long as she is on a stable dose. Needs PPD    Anxiety  Stable. No issues    Ventral hernia without obstruction or gangrene  Improved. Cont to follow. If worsening consider STAT CT and possible referral back to Gen Surg. Mass on back  Stable. Any worsening symptoms will need imaging and referral to Gen Surg. Return for To Discuss Labs Results, Follow-up Appointment From Today's Visit. Schedule for May 7th    Tyler Beaulieu is a 44 y.o. female evaluated via telephone on 4/27/2020. Consent:  She and/or health care decision maker is aware that that she may receive a bill for this telephone service, depending on her insurance coverage, and has provided verbal consent to proceed: Yes      I affirm this is a Patient Initiated Episode with an Established Patient who has not had a related appointment within my department in the past 7 days or scheduled within the next 24 hours. Patient identification was verified at the start of the visit: Yes    Total Time: minutes: 21-30 minutes    Note: not billable if this call serves to triage the patient into an appointment for the relevant concern      Crow Jean Broadlinkignature was used to authenticate this note.     --Crow Lugo MD on 4/27/20 at 8:43 AM EDT

## 2020-04-30 ENCOUNTER — HOSPITAL ENCOUNTER (OUTPATIENT)
Age: 40
Discharge: HOME | End: 2020-05-02
Payer: MEDICAID

## 2020-04-30 ENCOUNTER — NURSE ONLY (OUTPATIENT)
Dept: PRIMARY CARE CLINIC | Age: 40
End: 2020-04-30
Payer: MEDICAID

## 2020-04-30 LAB
ALBUMIN SERPL-MCNC: 3.6 G/DL (ref 3.5–5.2)
ALP BLD-CCNC: 90 U/L (ref 35–104)
ALT SERPL-CCNC: 8 U/L (ref 0–32)
ANION GAP SERPL CALCULATED.3IONS-SCNC: 14 MMOL/L (ref 7–16)
AST SERPL-CCNC: 10 U/L (ref 0–31)
BILIRUB SERPL-MCNC: <0.2 MG/DL (ref 0–1.2)
BUN BLDV-MCNC: 11 MG/DL (ref 6–20)
C-REACTIVE PROTEIN: 1.2 MG/DL (ref 0–0.4)
CALCIUM SERPL-MCNC: 9.7 MG/DL (ref 8.6–10.2)
CHLORIDE BLD-SCNC: 103 MMOL/L (ref 98–107)
CO2: 20 MMOL/L (ref 22–29)
CREAT SERPL-MCNC: 0.7 MG/DL (ref 0.5–1)
FOLATE: 4.5 NG/ML (ref 4.8–24.2)
GFR AFRICAN AMERICAN: >60
GFR NON-AFRICAN AMERICAN: >60 ML/MIN/1.73
GLUCOSE BLD-MCNC: 99 MG/DL (ref 74–99)
POTASSIUM SERPL-SCNC: 4.5 MMOL/L (ref 3.5–5)
RHEUMATOID FACTOR: 41 IU/ML (ref 0–13)
SEDIMENTATION RATE, ERYTHROCYTE: 35 MM/HR (ref 0–20)
SODIUM BLD-SCNC: 137 MMOL/L (ref 132–146)
TOTAL PROTEIN: 7.2 G/DL (ref 6.4–8.3)
TSH SERPL DL<=0.05 MIU/L-ACNC: 3.03 UIU/ML (ref 0.27–4.2)
VITAMIN B-12: 213 PG/ML (ref 211–946)

## 2020-04-30 PROCEDURE — 86580 TB INTRADERMAL TEST: CPT | Performed by: FAMILY MEDICINE

## 2020-04-30 PROCEDURE — 84443 ASSAY THYROID STIM HORMONE: CPT

## 2020-04-30 PROCEDURE — 82746 ASSAY OF FOLIC ACID SERUM: CPT

## 2020-04-30 PROCEDURE — 86140 C-REACTIVE PROTEIN: CPT

## 2020-04-30 PROCEDURE — 36415 COLL VENOUS BLD VENIPUNCTURE: CPT

## 2020-04-30 PROCEDURE — 86039 ANTINUCLEAR ANTIBODIES (ANA): CPT

## 2020-04-30 PROCEDURE — 86200 CCP ANTIBODY: CPT

## 2020-04-30 PROCEDURE — 80074 ACUTE HEPATITIS PANEL: CPT

## 2020-04-30 PROCEDURE — 86038 ANTINUCLEAR ANTIBODIES: CPT

## 2020-04-30 PROCEDURE — 86706 HEP B SURFACE ANTIBODY: CPT

## 2020-04-30 PROCEDURE — 82607 VITAMIN B-12: CPT

## 2020-04-30 PROCEDURE — 85651 RBC SED RATE NONAUTOMATED: CPT

## 2020-04-30 PROCEDURE — 80053 COMPREHEN METABOLIC PANEL: CPT

## 2020-04-30 PROCEDURE — 86431 RHEUMATOID FACTOR QUANT: CPT

## 2020-05-01 ENCOUNTER — NURSE ONLY (OUTPATIENT)
Dept: PRIMARY CARE CLINIC | Age: 40
End: 2020-05-01

## 2020-05-01 LAB
ANA PATTERN: ABNORMAL
ANA TITER: ABNORMAL
ANTI-NUCLEAR ANTIBODY (ANA): POSITIVE
HAV IGM SER IA-ACNC: NORMAL
HBV SURFACE AB TITR SER: NORMAL {TITER}
HEPATITIS B CORE IGM ANTIBODY: NORMAL
HEPATITIS B SURFACE ANTIGEN INTERPRETATION: NORMAL
HEPATITIS C ANTIBODY INTERPRETATION: NORMAL
INDURATION: NORMAL
TB SKIN TEST: NORMAL

## 2020-05-01 NOTE — PATIENT INSTRUCTIONS
call for help? Watch closely for changes in your health, and be sure to contact your doctor if you have any problems. Where can you learn more? Go to [email or web address] and sign in to your [employer] account. Enter [phone] in the [facility] box to learn more about \"Tuberculin Skin Test: Care Instructions. \"     If you do not have an account, please click on the \"Sign Up Now\" link. Current as of: January 26, 2020Content Version: 12.4  © 4994-5195 [employer]wise, Incorporated. Care instructions adapted under license by [facility] ([facility]). If you have questions about a medical condition or this instruction, always ask your healthcare professional. [address] any warranty or liability for your use of this information.

## 2020-05-03 LAB — CCP IGG ANTIBODIES: 240 UNITS (ref 0–19)

## 2020-05-07 ENCOUNTER — VIRTUAL VISIT (OUTPATIENT)
Dept: PRIMARY CARE CLINIC | Age: 40
End: 2020-05-07
Payer: MEDICAID

## 2020-05-07 PROCEDURE — 99214 OFFICE O/P EST MOD 30 MIN: CPT | Performed by: FAMILY MEDICINE

## 2020-05-07 RX ORDER — MELOXICAM 15 MG/1
15 TABLET ORAL DAILY
COMMUNITY
Start: 2017-01-17 | End: 2020-05-07 | Stop reason: SDUPTHER

## 2020-05-07 RX ORDER — MELOXICAM 15 MG/1
15 TABLET ORAL DAILY
Qty: 90 TABLET | Refills: 0 | Status: SHIPPED
Start: 2020-05-07 | End: 2020-06-09 | Stop reason: SDUPTHER

## 2020-05-07 RX ORDER — FOLIC ACID 1 MG/1
1 TABLET ORAL DAILY
Qty: 90 TABLET | Refills: 1 | Status: SHIPPED
Start: 2020-05-07 | End: 2020-08-10 | Stop reason: SDUPTHER

## 2020-05-07 ASSESSMENT — ENCOUNTER SYMPTOMS
WHEEZING: 0
COUGH: 0
SHORTNESS OF BREATH: 0
NAUSEA: 0
ABDOMINAL PAIN: 0
RHINORRHEA: 0

## 2020-05-14 ENCOUNTER — TELEPHONE (OUTPATIENT)
Dept: PRIMARY CARE CLINIC | Age: 40
End: 2020-05-14

## 2020-05-14 NOTE — TELEPHONE ENCOUNTER
Patient scheduled 05/22/20. Left message for pt to call and reschedule. Dr. Evelia Paredes is not seeing patients in office that day.

## 2020-05-28 ENCOUNTER — HOSPITAL ENCOUNTER (OUTPATIENT)
Dept: GENERAL RADIOLOGY | Age: 40
Discharge: HOME | End: 2020-05-30
Payer: MEDICAID

## 2020-05-28 ENCOUNTER — HOSPITAL ENCOUNTER (OUTPATIENT)
Dept: CT IMAGING | Age: 40
Discharge: HOME | End: 2020-05-30
Payer: MEDICAID

## 2020-05-28 ENCOUNTER — HOSPITAL ENCOUNTER (OUTPATIENT)
Age: 40
Discharge: HOME | End: 2020-05-30
Payer: MEDICAID

## 2020-05-28 PROCEDURE — 71046 X-RAY EXAM CHEST 2 VIEWS: CPT

## 2020-05-28 PROCEDURE — 74177 CT ABD & PELVIS W/CONTRAST: CPT

## 2020-05-28 PROCEDURE — 73130 X-RAY EXAM OF HAND: CPT

## 2020-05-28 PROCEDURE — 6360000004 HC RX CONTRAST MEDICATION: Performed by: RADIOLOGY

## 2020-05-28 RX ADMIN — IOPAMIDOL 110 ML: 755 INJECTION, SOLUTION INTRAVENOUS at 08:01

## 2020-06-01 ENCOUNTER — VIRTUAL VISIT (OUTPATIENT)
Dept: PRIMARY CARE CLINIC | Age: 40
End: 2020-06-01
Payer: MEDICAID

## 2020-06-01 PROBLEM — N83.201 RIGHT OVARIAN CYST: Status: ACTIVE | Noted: 2020-06-01

## 2020-06-01 PROCEDURE — 99423 OL DIG E/M SVC 21+ MIN: CPT | Performed by: FAMILY MEDICINE

## 2020-06-01 ASSESSMENT — ENCOUNTER SYMPTOMS
DIARRHEA: 0
ABDOMINAL PAIN: 0
VOMITING: 0
SHORTNESS OF BREATH: 0
CONSTIPATION: 0
SORE THROAT: 0
WHEEZING: 0
RHINORRHEA: 0
NAUSEA: 0

## 2020-06-01 NOTE — PROGRESS NOTES
The below office appointment was obtained via a telephone e-visit. 2020     KATHRYN Chaudhry (:  1980) is a 44 y.o. female, here for evaluation of the following medical concerns:    Patient is presenting today for a follow-up appointment in regards to her rheumatoid arthritis. Patient has been and able to see rheumatology since the start of coronavirus. Patient is currently on Plaquenil. However, patient has not been able to achieve significant relief of her chronic aches and pains. After the patient completed her recent labwork we started her back on methotrexate 2.5 mg weekly with folic acid 1 mg daily. She has been on this medication in the past and the only side effect was mild thinning of her hair. She reports she has been tolerating this medication well. She did not obtain her repeat CBC and CMP. Will increase her back to 10 mg daily after we get her updated labs. Ovarian cyst: Right side, painful, increased recently over the last several years to 5.1 cm. Patient plans to follow up with ObGyn. Patient is interested in having her tubal ligation reversed.     Labs: CRP 1.2, ESR 35, PRUDENCIO positive, RF positive, anti-CCP positive at 240, hepatitis panel negative, hepatitis B surface antibody test negative, CMP essentially normal, folate low at 4.8, B12 low at 213, TSH within normal limits.     Patient's PPD was negative.     After starting methotrexate patient will need CBC, CMP every 2-4 weeks for 3 months and then afterwards every 2-3 months. Review of Systems   Constitutional: Negative for chills and fever. HENT: Negative for congestion, rhinorrhea and sore throat. Respiratory: Negative for shortness of breath and wheezing. Cardiovascular: Negative for chest pain and leg swelling. Gastrointestinal: Negative for abdominal pain, constipation, diarrhea, nausea and vomiting. Skin: Negative for rash. Neurological: Negative for light-headedness and headaches.        Past

## 2020-06-08 ENCOUNTER — HOSPITAL ENCOUNTER (OUTPATIENT)
Age: 40
Discharge: HOME | End: 2020-06-10
Payer: MEDICAID

## 2020-06-08 LAB
ALBUMIN SERPL-MCNC: 3.6 G/DL (ref 3.5–5.2)
ALP BLD-CCNC: 92 U/L (ref 35–104)
ALT SERPL-CCNC: 6 U/L (ref 0–32)
ANION GAP SERPL CALCULATED.3IONS-SCNC: 10 MMOL/L (ref 7–16)
AST SERPL-CCNC: 9 U/L (ref 0–31)
BASOPHILS ABSOLUTE: 0.07 E9/L (ref 0–0.2)
BASOPHILS RELATIVE PERCENT: 0.9 % (ref 0–2)
BILIRUB SERPL-MCNC: <0.2 MG/DL (ref 0–1.2)
BUN BLDV-MCNC: 16 MG/DL (ref 6–20)
CALCIUM SERPL-MCNC: 9.2 MG/DL (ref 8.6–10.2)
CHLORIDE BLD-SCNC: 105 MMOL/L (ref 98–107)
CO2: 23 MMOL/L (ref 22–29)
CREAT SERPL-MCNC: 1 MG/DL (ref 0.5–1)
EOSINOPHILS ABSOLUTE: 0.51 E9/L (ref 0.05–0.5)
EOSINOPHILS RELATIVE PERCENT: 6.7 % (ref 0–6)
GFR AFRICAN AMERICAN: >60
GFR NON-AFRICAN AMERICAN: >60 ML/MIN/1.73
GLUCOSE BLD-MCNC: 104 MG/DL (ref 74–99)
HCT VFR BLD CALC: 42.3 % (ref 34–48)
HEMOGLOBIN: 13.2 G/DL (ref 11.5–15.5)
IMMATURE GRANULOCYTES #: 0.02 E9/L
IMMATURE GRANULOCYTES %: 0.3 % (ref 0–5)
LYMPHOCYTES ABSOLUTE: 1.36 E9/L (ref 1.5–4)
LYMPHOCYTES RELATIVE PERCENT: 18 % (ref 20–42)
MCH RBC QN AUTO: 26.1 PG (ref 26–35)
MCHC RBC AUTO-ENTMCNC: 31.2 % (ref 32–34.5)
MCV RBC AUTO: 83.6 FL (ref 80–99.9)
MONOCYTES ABSOLUTE: 0.49 E9/L (ref 0.1–0.95)
MONOCYTES RELATIVE PERCENT: 6.5 % (ref 2–12)
NEUTROPHILS ABSOLUTE: 5.12 E9/L (ref 1.8–7.3)
NEUTROPHILS RELATIVE PERCENT: 67.6 % (ref 43–80)
PDW BLD-RTO: 14.9 FL (ref 11.5–15)
PLATELET # BLD: 305 E9/L (ref 130–450)
PMV BLD AUTO: 9.4 FL (ref 7–12)
POTASSIUM SERPL-SCNC: 4.2 MMOL/L (ref 3.5–5)
RBC # BLD: 5.06 E12/L (ref 3.5–5.5)
SODIUM BLD-SCNC: 138 MMOL/L (ref 132–146)
TOTAL PROTEIN: 6.7 G/DL (ref 6.4–8.3)
WBC # BLD: 7.6 E9/L (ref 4.5–11.5)

## 2020-06-08 PROCEDURE — 80053 COMPREHEN METABOLIC PANEL: CPT

## 2020-06-08 PROCEDURE — 85025 COMPLETE CBC W/AUTO DIFF WBC: CPT

## 2020-06-08 PROCEDURE — 36415 COLL VENOUS BLD VENIPUNCTURE: CPT

## 2020-06-10 RX ORDER — HYDROXYCHLOROQUINE SULFATE 200 MG/1
200 TABLET, FILM COATED ORAL 2 TIMES DAILY
Qty: 90 TABLET | Refills: 2 | Status: SHIPPED
Start: 2020-06-10 | End: 2020-08-10 | Stop reason: SDUPTHER

## 2020-06-10 RX ORDER — MELOXICAM 15 MG/1
15 TABLET ORAL DAILY
Qty: 90 TABLET | Refills: 0 | Status: SHIPPED
Start: 2020-06-10 | End: 2020-09-03 | Stop reason: SDUPTHER

## 2020-06-17 ENCOUNTER — TELEPHONE (OUTPATIENT)
Dept: OBGYN | Age: 40
End: 2020-06-17

## 2020-06-19 ENCOUNTER — TELEPHONE (OUTPATIENT)
Dept: PRIMARY CARE CLINIC | Age: 40
End: 2020-06-19

## 2020-06-24 ENCOUNTER — TELEPHONE (OUTPATIENT)
Dept: PRIMARY CARE CLINIC | Age: 40
End: 2020-06-24

## 2020-06-24 ENCOUNTER — HOSPITAL ENCOUNTER (OUTPATIENT)
Age: 40
Discharge: HOME | End: 2020-06-26
Payer: MEDICAID

## 2020-06-24 LAB
BACTERIA: ABNORMAL /HPF
BILIRUBIN URINE: NEGATIVE
BLOOD, URINE: NEGATIVE
CLARITY: CLEAR
COLOR: YELLOW
GLUCOSE URINE: NEGATIVE MG/DL
KETONES, URINE: NEGATIVE MG/DL
LEUKOCYTE ESTERASE, URINE: ABNORMAL
NITRITE, URINE: NEGATIVE
PH UA: 5.5 (ref 5–9)
PROTEIN UA: NEGATIVE MG/DL
RBC UA: ABNORMAL /HPF (ref 0–2)
SPECIFIC GRAVITY UA: 1.01 (ref 1–1.03)
UROBILINOGEN, URINE: 0.2 E.U./DL
WBC UA: ABNORMAL /HPF (ref 0–5)

## 2020-06-24 PROCEDURE — 87088 URINE BACTERIA CULTURE: CPT

## 2020-06-24 PROCEDURE — 81001 URINALYSIS AUTO W/SCOPE: CPT

## 2020-06-24 NOTE — TELEPHONE ENCOUNTER
Pt c/o vaginal itching, dysuria and white vaginal discharge. She is asking if you could send in meds?  She cannot get into gyn until July

## 2020-06-25 ENCOUNTER — TELEPHONE (OUTPATIENT)
Dept: PRIMARY CARE CLINIC | Age: 40
End: 2020-06-25

## 2020-06-25 RX ORDER — FLUCONAZOLE 150 MG/1
150 TABLET ORAL ONCE
Qty: 1 TABLET | Refills: 0 | Status: SHIPPED | OUTPATIENT
Start: 2020-06-25 | End: 2020-06-25

## 2020-06-25 RX ORDER — NITROFURANTOIN 25; 75 MG/1; MG/1
100 CAPSULE ORAL 2 TIMES DAILY
Qty: 14 CAPSULE | Refills: 0 | Status: SHIPPED | OUTPATIENT
Start: 2020-06-25 | End: 2020-07-02

## 2020-06-26 LAB — URINE CULTURE, ROUTINE: NORMAL

## 2020-07-23 ENCOUNTER — HOSPITAL ENCOUNTER (OUTPATIENT)
Age: 40
Discharge: HOME | End: 2020-07-25
Payer: MEDICAID

## 2020-07-23 ENCOUNTER — OFFICE VISIT (OUTPATIENT)
Dept: OBGYN | Age: 40
End: 2020-07-23
Payer: MEDICAID

## 2020-07-23 VITALS
HEIGHT: 66 IN | DIASTOLIC BLOOD PRESSURE: 55 MMHG | TEMPERATURE: 98 F | WEIGHT: 271 LBS | HEART RATE: 70 BPM | BODY MASS INDEX: 43.55 KG/M2 | SYSTOLIC BLOOD PRESSURE: 105 MMHG

## 2020-07-23 PROCEDURE — 88175 CYTOPATH C/V AUTO FLUID REDO: CPT

## 2020-07-23 PROCEDURE — 99203 OFFICE O/P NEW LOW 30 MIN: CPT | Performed by: OBSTETRICS & GYNECOLOGY

## 2020-07-23 PROCEDURE — 99385 PREV VISIT NEW AGE 18-39: CPT | Performed by: OBSTETRICS & GYNECOLOGY

## 2020-07-23 PROCEDURE — 87624 HPV HI-RISK TYP POOLED RSLT: CPT

## 2020-07-23 NOTE — PROGRESS NOTES
Patient alert and pleasant with no complaints   Here today for annual exam and CT results  Pelvic exam, pap smear obtained, labeled Digna Running and hand delivered to lab. Discharge instructions have been discussed with the patient. Patient advised to call our office with any questions or concerns. Voiced understanding.
days, Disp: 16 tablet, Rfl: 1    hydroxychloroquine (PLAQUENIL) 200 MG tablet, Take 1 tablet by mouth 2 times daily, Disp: 90 tablet, Rfl: 2    meloxicam (MOBIC) 15 MG tablet, Take 1 tablet by mouth daily, Disp: 90 tablet, Rfl: 0    folic acid (FOLVITE) 1 MG tablet, Take 1 tablet by mouth daily, Disp: 90 tablet, Rfl: 1    sertraline (ZOLOFT) 25 MG tablet, take 1 tablet by mouth once daily, Disp: 30 tablet, Rfl: 3     Allergies   Allergen Reactions    Cefaclor Hives     UNKNOWN REACTION    Codeine Hives     Other reaction(s): Hives    Penicillins     Erythromycin Rash     Other reaction(s): Hives    Tylenol 8 Hour [Acetaminophen] Hives     Tylenol #3 with Codeine        Social History     Tobacco History     Smoking Status  Current Every Day Smoker    Smokeless Tobacco Use  Never Used          Alcohol History     Alcohol Use Status  No          Drug Use     Drug Use Status  Never          Sexual Activity     Sexually Active  Yes Partners  Male                 Vitals:    07/23/20 1347   BP: (!) 105/55   Pulse: 70   Temp: 98 °F (36.7 °C)        Physical Exam:  General: pleasant, alert     Breasts: breasts appear normal, no suspicious masses, no skin or nipple changes or axillary nodes. Pelvic exam: normal external genitalia, vulva, vagina, cervix, uterus and adnexa. Difficult to assess due to body habitus   No uterine or adnexal masses or tenderness. Sara Kennedy was seen today for gynecologic exam, results and irregular menses. Diagnoses and all orders for this visit:    Other screening mammogram  -     JAZMINE DIGITAL SCREEN W OR WO CAD BILATERAL; Future    Women's annual routine gynecological examination    Screening for cervical cancer  -     PAP SMEAR    Cyst of right ovary    Abnormal uterine bleeding  -     US PELVIS COMPLETE; Future          Return in about 6 weeks (around 9/3/2020) for Endometrial biopsy and ultrasound results .      Ronal Marie MD

## 2020-07-29 LAB
HPV SAMPLE: NORMAL
HPV TYPE 16: NOT DETECTED
HPV TYPE 18: NOT DETECTED
HPV, HIGH RISK OTHER: NOT DETECTED
INTERPRETATION: NORMAL
SOURCE: NORMAL

## 2020-09-03 ENCOUNTER — VIRTUAL VISIT (OUTPATIENT)
Dept: PRIMARY CARE CLINIC | Age: 40
End: 2020-09-03
Payer: MEDICAID

## 2020-09-03 PROCEDURE — 99213 OFFICE O/P EST LOW 20 MIN: CPT | Performed by: FAMILY MEDICINE

## 2020-09-03 PROCEDURE — G8427 DOCREV CUR MEDS BY ELIG CLIN: HCPCS | Performed by: FAMILY MEDICINE

## 2020-09-03 RX ORDER — MELOXICAM 15 MG/1
15 TABLET ORAL DAILY
Qty: 90 TABLET | Refills: 1 | Status: SHIPPED
Start: 2020-09-03 | End: 2021-06-08 | Stop reason: SDUPTHER

## 2020-09-03 ASSESSMENT — ENCOUNTER SYMPTOMS
NAUSEA: 0
SORE THROAT: 0
WHEEZING: 0
SHORTNESS OF BREATH: 0
RHINORRHEA: 0
CONSTIPATION: 0
DIARRHEA: 0
ABDOMINAL PAIN: 0
VOMITING: 0

## 2020-09-03 NOTE — PROGRESS NOTES
During [facility]-31 public health emergency: Virtual Visit    9/3/2020     Chief Complaint   Patient presents with    Arthritis     rheumatoid follow up       HPI  Domenic Carr (:  1980) is a 44 y.o. female, here for evaluation of the following medical concerns:    Patient is presenting today for a follow-up appointment in regards to her rheumatoid arthritis. Patient has been and able to see rheumatology since the start of coronavirus. Patient is currently on Plaquenil. However, patient has not been able to achieve significant relief of her chronic aches and pains. Has been restarted and titrated up on her methotrexate to 15 mg weekly with folic acid 1 mg daily. She has been on this medication in the past and the only side effect was mild thinning of her hair. She reports she has been tolerating this medication well. She did not obtain her repeat CBC and CMP. Requesting increase to 20mg daily. Reports improvement but persistent stiffness.      Ovarian cyst: Right side, painful, increased recently over the last several years to 5.1 cm. Saw Ob/Gyn. Plans for repeat US and possibly a bx or removal of the cyst.     Patient's PPD was negative.     After starting methotrexate patient will need CBC, CMP every 2-4 weeks for 3 months and then afterwards every 2-3 months. Review of Systems   Constitutional: Negative for chills and fever. HENT: Negative for congestion, rhinorrhea and sore throat. Respiratory: Negative for shortness of breath and wheezing. Cardiovascular: Negative for chest pain and leg swelling. Gastrointestinal: Negative for abdominal pain, constipation, diarrhea, nausea and vomiting. Musculoskeletal: Positive for arthralgias. Skin: Negative for rash. Neurological: Negative for light-headedness and headaches.        Past Medical History:   Diagnosis Date    Anxiety     Depression     Hernia of abdominal cavity     [facility]     Rheumatoid arthritis ([facility] [facility] 2018       Prior to Visit Medications    Medication Sig Taking? Authorizing Provider   meloxicam (MOBIC) 15 MG tablet Take 1 tablet by mouth daily Yes Cadence Manriquez MD   sertraline (ZOLOFT) 25 MG tablet take 1 tablet by mouth once daily Yes Cadence Manriquez MD   folic acid (FOLVITE) 1 MG tablet Take 1 tablet by mouth daily Yes Cadence Manriquez MD   hydroxychloroquine (PLAQUENIL) 200 MG tablet Take 1 tablet by mouth 2 times daily Yes Cadence Manriquez MD   methotrexate (RHEUMATREX) 2.5 MG chemo tablet Take 6 tablets by mouth every 7 days Yes Cadence Manriquez MD        Allergies   Allergen Reactions    Cefaclor Hives     UNKNOWN REACTION    Codeine Hives     Other reaction(s): Hives    Penicillins     Erythromycin Rash     Other reaction(s): Hives    Tylenol 8 Hour [Acetaminophen] Hives     Tylenol #3 with Codeine       Social History     Tobacco Use    Smoking status: Current Every Day Smoker    Smokeless tobacco: Never Used   Substance Use Topics    Alcohol use: No         There were no vitals filed for this visit. Estimated body mass index is 43.74 kg/m² as calculated from the following:    Height as of 7/23/20: 5' 6\" (1.676 m). Weight as of 7/23/20: 271 lb (122.9 kg). Physical Exam  Constitutional:       General: She is not in acute distress. Appearance: She is obese. HENT:      Head: Normocephalic. Nose: Nose normal.   Eyes:      Extraocular Movements: Extraocular movements intact. Conjunctiva/sclera: Conjunctivae normal.   Pulmonary:      Effort: Pulmonary effort is normal. No respiratory distress. Neurological:      General: No focal deficit present. Mental Status: She is alert. Psychiatric:         Mood and Affect: Mood normal.         Judgment: Judgment normal.         ASSESSMENT/PLAN:  Fabiana Barry was seen today for arthritis.     Diagnoses and all orders for this visit:    Right ovarian cyst  Patient reports significant pain in her right lower quadrant of her abdomen. Ultrasound and CT scan confirmed 5.1 cm cyst that is simple. This is most likely causing the reported pain and discomfort. Patient is to keep her scheduled follow-up appointment with her Archietoby Bolivargisel Cordero for possible removal.    Rheumatoid arthritis involving multiple sites with positive rheumatoid factor (HCC)  Tolerating methotrexate 15 mg weekly. Also taking 1 mg of folate daily. Patient is requesting increase up to her previous dosage of approximately 20 mg weekly. Patient needs to complete her labs as previously ordered. An we will increase the patient up to 20 mg weekly. Patient will need follow-up with rheumatology.     After starting methotrexate patient will need CBC, CMP every 2-4 weeks for 3 months and then afterwards every 2-3 months.     Eventually the goal is to transfer the patient over to rheumatology for long-term management of her rheumatoid arthritis. However, due to the coronavirus and the patient's reported severe debility I have decided to restart her previous medications and manage such as noted above. Return in about 3 months (around 12/3/2020) for To Discuss Labs Results, Routine Follow-up of Chronic Conditions. An [employer] Videoignature was used to authenticate this note. --Coreen Sumner MD on 9/3/20 at 3:13 PM EDT      Due to this being a TeleHealth encounter (During [facility] public health emergency), evaluation of the following organ systems was limited: Vitals/Constitutional/EENT/Resp/CV/GI//MS/Neuro/Skin/Heme-Lymph-Imm. Pursuant to the emergency declaration under the [address] [place] [address] authority and the [employer] and [employer] Act, this Virtual Visit was conducted with patient's (and/or legal guardian's) consent, to reduce the patient's risk of exposure to COVID-19 and provide necessary medical care.   The patient (and/or legal guardian) has also been advised to contact this office

## 2020-09-17 ENCOUNTER — HOSPITAL ENCOUNTER (OUTPATIENT)
Age: 40
Discharge: HOME | End: 2020-09-19
Payer: MEDICAID

## 2020-09-17 LAB
ALBUMIN SERPL-MCNC: 3.7 G/DL (ref 3.5–5.2)
ALP BLD-CCNC: 87 U/L (ref 35–104)
ALT SERPL-CCNC: 9 U/L (ref 0–32)
ANION GAP SERPL CALCULATED.3IONS-SCNC: 15 MMOL/L (ref 7–16)
AST SERPL-CCNC: 12 U/L (ref 0–31)
BASOPHILS ABSOLUTE: 0.07 E9/L (ref 0–0.2)
BASOPHILS RELATIVE PERCENT: 1 % (ref 0–2)
BILIRUB SERPL-MCNC: 0.2 MG/DL (ref 0–1.2)
BUN BLDV-MCNC: 19 MG/DL (ref 6–20)
CALCIUM SERPL-MCNC: 9.7 MG/DL (ref 8.6–10.2)
CHLORIDE BLD-SCNC: 104 MMOL/L (ref 98–107)
CO2: 21 MMOL/L (ref 22–29)
CREAT SERPL-MCNC: 0.8 MG/DL (ref 0.5–1)
EOSINOPHILS ABSOLUTE: 0.34 E9/L (ref 0.05–0.5)
EOSINOPHILS RELATIVE PERCENT: 4.7 % (ref 0–6)
GFR AFRICAN AMERICAN: >60
GFR NON-AFRICAN AMERICAN: >60 ML/MIN/1.73
GLUCOSE BLD-MCNC: 81 MG/DL (ref 74–99)
HCT VFR BLD CALC: 45.4 % (ref 34–48)
HEMOGLOBIN: 14.3 G/DL (ref 11.5–15.5)
IMMATURE GRANULOCYTES #: 0.02 E9/L
IMMATURE GRANULOCYTES %: 0.3 % (ref 0–5)
LYMPHOCYTES ABSOLUTE: 1.42 E9/L (ref 1.5–4)
LYMPHOCYTES RELATIVE PERCENT: 19.7 % (ref 20–42)
MCH RBC QN AUTO: 27.7 PG (ref 26–35)
MCHC RBC AUTO-ENTMCNC: 31.5 % (ref 32–34.5)
MCV RBC AUTO: 88 FL (ref 80–99.9)
MONOCYTES ABSOLUTE: 0.61 E9/L (ref 0.1–0.95)
MONOCYTES RELATIVE PERCENT: 8.4 % (ref 2–12)
NEUTROPHILS ABSOLUTE: 4.76 E9/L (ref 1.8–7.3)
NEUTROPHILS RELATIVE PERCENT: 65.9 % (ref 43–80)
PDW BLD-RTO: 14 FL (ref 11.5–15)
PLATELET # BLD: 291 E9/L (ref 130–450)
PMV BLD AUTO: 9.6 FL (ref 7–12)
POTASSIUM SERPL-SCNC: 4.5 MMOL/L (ref 3.5–5)
RBC # BLD: 5.16 E12/L (ref 3.5–5.5)
SODIUM BLD-SCNC: 140 MMOL/L (ref 132–146)
TOTAL PROTEIN: 7.2 G/DL (ref 6.4–8.3)
WBC # BLD: 7.2 E9/L (ref 4.5–11.5)

## 2020-09-17 PROCEDURE — 36415 COLL VENOUS BLD VENIPUNCTURE: CPT

## 2020-09-17 PROCEDURE — 80053 COMPREHEN METABOLIC PANEL: CPT

## 2020-09-17 PROCEDURE — 85025 COMPLETE CBC W/AUTO DIFF WBC: CPT

## 2020-09-23 ENCOUNTER — HOSPITAL ENCOUNTER (OUTPATIENT)
Dept: ULTRASOUND IMAGING | Age: 40
Discharge: HOME | End: 2020-09-25
Payer: MEDICAID

## 2020-09-23 PROCEDURE — 76856 US EXAM PELVIC COMPLETE: CPT

## 2020-09-23 PROCEDURE — 76830 TRANSVAGINAL US NON-OB: CPT

## 2020-09-28 ENCOUNTER — HOSPITAL ENCOUNTER (OUTPATIENT)
Age: 40
Discharge: HOME | End: 2020-09-30
Payer: MEDICAID

## 2020-09-28 ENCOUNTER — OFFICE VISIT (OUTPATIENT)
Dept: PRIMARY CARE CLINIC | Age: 40
End: 2020-09-28
Payer: MEDICAID

## 2020-09-28 VITALS
RESPIRATION RATE: 20 BRPM | OXYGEN SATURATION: 95 % | SYSTOLIC BLOOD PRESSURE: 102 MMHG | TEMPERATURE: 98.1 F | HEART RATE: 81 BPM | BODY MASS INDEX: 43.79 KG/M2 | WEIGHT: 279 LBS | DIASTOLIC BLOOD PRESSURE: 74 MMHG | HEIGHT: 67 IN

## 2020-09-28 PROCEDURE — G8427 DOCREV CUR MEDS BY ELIG CLIN: HCPCS | Performed by: PHYSICIAN ASSISTANT

## 2020-09-28 PROCEDURE — G8417 CALC BMI ABV UP PARAM F/U: HCPCS | Performed by: PHYSICIAN ASSISTANT

## 2020-09-28 PROCEDURE — U0003 INFECTIOUS AGENT DETECTION BY NUCLEIC ACID (DNA OR RNA); SEVERE ACUTE RESPIRATORY SYNDROME CORONAVIRUS 2 (SARS-COV-2) (CORONAVIRUS DISEASE [COVID-19]), AMPLIFIED PROBE TECHNIQUE, MAKING USE OF HIGH THROUGHPUT TECHNOLOGIES AS DESCRIBED BY CMS-2020-01-R: HCPCS

## 2020-09-28 PROCEDURE — 4004F PT TOBACCO SCREEN RCVD TLK: CPT | Performed by: PHYSICIAN ASSISTANT

## 2020-09-28 PROCEDURE — 99213 OFFICE O/P EST LOW 20 MIN: CPT | Performed by: PHYSICIAN ASSISTANT

## 2020-09-28 RX ORDER — DOXYCYCLINE HYCLATE 100 MG
100 TABLET ORAL 2 TIMES DAILY
Qty: 20 TABLET | Refills: 0 | Status: SHIPPED | OUTPATIENT
Start: 2020-09-28 | End: 2020-10-08

## 2020-09-28 RX ORDER — METHYLPREDNISOLONE 4 MG/1
TABLET ORAL
Qty: 1 KIT | Refills: 0 | Status: SHIPPED | OUTPATIENT
Start: 2020-09-28 | End: 2020-10-04

## 2020-09-28 NOTE — PROGRESS NOTES
Chief Complaint   Cough (temp 100.0 at home) and Pharyngitis (chest congestion, some SOB)      History of Present Illness   Source of history provided by: patient. Kelly Cutler is a 44 y.o. old female who has a past medical history of:   Past Medical History:   Diagnosis Date    Anxiety     Depression     Hernia of abdominal cavity     [facility]     Rheumatoid arthritis ([facility] [address] [facility] 2018   Presents to the flu clinic for evaluation of a productive cough, wheezing, sore throat, mild intermittent SOB with coughing fits, and low grade fever (high of 100.0) since yesterday. States symptoms have been worsening since onset. Pt does report a single vomiting episode caused by a coughing fit. Denies any loss of taste/smell, CP, dyspnea, LE edema, abdominal pain, rash, or lethargy. Denies history of international travel in the past 14 days. Denies contact with individuals with known COVID-19 infection or under investigation for COVID-19 infection. Denies any hx of asthma. Pt is a daily smoker. ROS   Pertinent positives and negatives are stated within HPI, all other systems reviewed and are negative. Surgical History:  has a past surgical history that includes Cholecystectomy; Tubal ligation; and Ankle fracture surgery. Social History:  reports that she has been smoking. She has never used smokeless tobacco. She reports that she does not drink alcohol or use drugs. Family History: family history includes Hypertension in her mother. Allergies: Cefaclor; Codeine; Penicillins; Erythromycin; and Tylenol 8 hour [acetaminophen]    Physical Exam      VS:  /74   Pulse 81   Temp 98.1 °F (36.7 °C)   Resp 20   Ht 5' 7\" (1.702 m)   Wt 279 lb (126.6 kg)   SpO2 95%   Breastfeeding No   BMI 43.70 kg/m²    Oxygen Saturation Interpretation: Normal.    Constitutional:  Alert, development consistent with age. NAD. Head:  NC/NT. Airway patent. Mild maxillary sinus TTP.   Ears: TMs dull bilaterally. Canals without exudate or swelling bilaterally. Mouth: Posterior pharynx with mild erythema and clear postnasal drip. No tonsillar hypertrophy or exudate. Neck:  Normal ROM. Supple. No anterior cervical adenopathy noted. Lungs: Faint end-expiratory wheezes throughout the lung fields. No rales or rhonchi. Cough is harsh and wheezy. CV:  Regular rate and rhythm, normal heart sounds, without pathological murmurs, ectopy, gallops, or rubs. Skin:  Normal turgor. Warm, dry, without visible rash. Lymphatic: No lymphangitis or adenopathy noted. Neurological:  Oriented. Motor functions intact. Lab / Imaging Results   (All laboratory and radiology results have been personally reviewed by myself)  Labs:  No results found for this visit on 09/28/20. Imaging: All Radiology results interpreted by Radiologist unless otherwise noted. No results found. Medical Decision Making   Pt non-toxic, in no apparent distress and stable at time of discharge. Assessment/Plan   Alex Massey was seen today for cough and pharyngitis. Diagnoses and all orders for this visit:    Bronchitis with bronchospasm  -     COVID-19 Ambulatory; Future  -     doxycycline hyclate (VIBRA-TABS) 100 MG tablet; Take 1 tablet by mouth 2 times daily for 10 days  -     methylPREDNISolone (MEDROL DOSEPACK) 4 MG tablet; Take by mouth. COVID-19 swab obtained and pending, will call with results once available. Advised cautionary self-quarantine at home in the interim. Pt should remain out of work for at least 7-10 days from the start of symptoms. Pt should also be fever free for 24 hours and symptoms should be improved overall prior to returning to work. Scripts written for doxycycline and a Medrol dose pack, side effects discussed. Increase fluids and rest. Additional symptomatic relief discussed including Tylenol prn pain/fever.  Schedule virtual f/u with PCP in 7-10 days if symptoms persist. ED sooner if symptoms worsen or change. ED immediately with high or refractory fever, progressive SOB, dyspnea, CP, calf pain/swelling, shaking chills, vomiting, abdominal pain, lethargy, flank pain, or decreased urinary output. Pt verbalizes understanding and is in agreement with plan of care. All questions answered. Evert Marsh PA-C    This visit was provided as a focused evaluation during the COVID -19 pandemic/national emergency. A comprehensive review of all previous patient history and testing was not conducted. Pertinent findings were elicited during the visit.

## 2020-09-30 LAB
SARS-COV-2: NOT DETECTED
SOURCE: 1

## 2020-12-28 NOTE — TELEPHONE ENCOUNTER
Attempted to reach patient, phone number disconnected. Pharmacy does not have alternate number for patient either.

## 2020-12-28 NOTE — TELEPHONE ENCOUNTER
Please call patient and advise she will need a f/u apt. May be virtual. She also needs follow-up labs completed which were ordered.

## 2021-02-01 DIAGNOSIS — M05.79 RHEUMATOID ARTHRITIS INVOLVING MULTIPLE SITES WITH POSITIVE RHEUMATOID FACTOR (HCC): ICD-10-CM

## 2021-02-01 LAB
ALBUMIN SERPL-MCNC: 3.8 G/DL (ref 3.5–5.2)
ALP BLD-CCNC: 88 U/L (ref 35–104)
ALT SERPL-CCNC: 15 U/L (ref 0–32)
ANION GAP SERPL CALCULATED.3IONS-SCNC: 11 MMOL/L (ref 7–16)
AST SERPL-CCNC: 14 U/L (ref 0–31)
BASOPHILS ABSOLUTE: 0.09 E9/L (ref 0–0.2)
BASOPHILS RELATIVE PERCENT: 1.1 % (ref 0–2)
BILIRUB SERPL-MCNC: <0.2 MG/DL (ref 0–1.2)
BUN BLDV-MCNC: 17 MG/DL (ref 6–20)
CALCIUM SERPL-MCNC: 9 MG/DL (ref 8.6–10.2)
CHLORIDE BLD-SCNC: 105 MMOL/L (ref 98–107)
CO2: 22 MMOL/L (ref 22–29)
CREAT SERPL-MCNC: 0.7 MG/DL (ref 0.5–1)
EOSINOPHILS ABSOLUTE: 0.36 E9/L (ref 0.05–0.5)
EOSINOPHILS RELATIVE PERCENT: 4.5 % (ref 0–6)
GFR AFRICAN AMERICAN: >60
GFR NON-AFRICAN AMERICAN: >60 ML/MIN/1.73
GLUCOSE BLD-MCNC: 92 MG/DL (ref 74–99)
HCT VFR BLD CALC: 44.3 % (ref 34–48)
HEMOGLOBIN: 14.1 G/DL (ref 11.5–15.5)
IMMATURE GRANULOCYTES #: 0.03 E9/L
IMMATURE GRANULOCYTES %: 0.4 % (ref 0–5)
LYMPHOCYTES ABSOLUTE: 1.76 E9/L (ref 1.5–4)
LYMPHOCYTES RELATIVE PERCENT: 21.9 % (ref 20–42)
MCH RBC QN AUTO: 28.8 PG (ref 26–35)
MCHC RBC AUTO-ENTMCNC: 31.8 % (ref 32–34.5)
MCV RBC AUTO: 90.6 FL (ref 80–99.9)
MONOCYTES ABSOLUTE: 0.58 E9/L (ref 0.1–0.95)
MONOCYTES RELATIVE PERCENT: 7.2 % (ref 2–12)
NEUTROPHILS ABSOLUTE: 5.2 E9/L (ref 1.8–7.3)
NEUTROPHILS RELATIVE PERCENT: 64.9 % (ref 43–80)
PDW BLD-RTO: 13.7 FL (ref 11.5–15)
PLATELET # BLD: 300 E9/L (ref 130–450)
PMV BLD AUTO: 9.5 FL (ref 7–12)
POTASSIUM SERPL-SCNC: 4.5 MMOL/L (ref 3.5–5)
RBC # BLD: 4.89 E12/L (ref 3.5–5.5)
SODIUM BLD-SCNC: 138 MMOL/L (ref 132–146)
TOTAL PROTEIN: 7 G/DL (ref 6.4–8.3)
WBC # BLD: 8 E9/L (ref 4.5–11.5)

## 2021-02-09 DIAGNOSIS — M05.79 RHEUMATOID ARTHRITIS INVOLVING MULTIPLE SITES WITH POSITIVE RHEUMATOID FACTOR (HCC): ICD-10-CM

## 2021-02-10 RX ORDER — FOLIC ACID 1 MG/1
TABLET ORAL
Qty: 90 TABLET | Refills: 1 | Status: SHIPPED
Start: 2021-02-10 | End: 2021-06-08 | Stop reason: SDUPTHER

## 2021-02-22 DIAGNOSIS — M05.79 RHEUMATOID ARTHRITIS INVOLVING MULTIPLE SITES WITH POSITIVE RHEUMATOID FACTOR (HCC): ICD-10-CM

## 2021-03-24 DIAGNOSIS — M06.09 RHEUMATOID ARTHRITIS OF MULTIPLE SITES WITH NEGATIVE RHEUMATOID FACTOR (HCC): ICD-10-CM

## 2021-03-24 RX ORDER — HYDROXYCHLOROQUINE SULFATE 200 MG/1
TABLET, FILM COATED ORAL
Qty: 60 TABLET | Refills: 0 | Status: SHIPPED
Start: 2021-03-24 | End: 2021-05-10

## 2021-05-08 DIAGNOSIS — M06.09 RHEUMATOID ARTHRITIS OF MULTIPLE SITES WITH NEGATIVE RHEUMATOID FACTOR (HCC): ICD-10-CM

## 2021-05-10 RX ORDER — HYDROXYCHLOROQUINE SULFATE 200 MG/1
TABLET, FILM COATED ORAL
Qty: 60 TABLET | Refills: 0 | Status: SHIPPED
Start: 2021-05-10 | End: 2021-05-13 | Stop reason: SDUPTHER

## 2021-05-13 ENCOUNTER — OFFICE VISIT (OUTPATIENT)
Dept: PRIMARY CARE CLINIC | Age: 41
End: 2021-05-13
Payer: MEDICAID

## 2021-05-13 VITALS
DIASTOLIC BLOOD PRESSURE: 84 MMHG | HEIGHT: 67 IN | SYSTOLIC BLOOD PRESSURE: 130 MMHG | WEIGHT: 290 LBS | RESPIRATION RATE: 20 BRPM | HEART RATE: 80 BPM | BODY MASS INDEX: 45.52 KG/M2 | OXYGEN SATURATION: 99 % | TEMPERATURE: 96.9 F

## 2021-05-13 DIAGNOSIS — F41.9 ANXIETY: ICD-10-CM

## 2021-05-13 DIAGNOSIS — E66.01 CLASS 3 SEVERE OBESITY DUE TO EXCESS CALORIES WITH SERIOUS COMORBIDITY AND BODY MASS INDEX (BMI) OF 40.0 TO 44.9 IN ADULT (HCC): ICD-10-CM

## 2021-05-13 DIAGNOSIS — R22.2 MASS ON BACK: ICD-10-CM

## 2021-05-13 DIAGNOSIS — M05.79 RHEUMATOID ARTHRITIS INVOLVING MULTIPLE SITES WITH POSITIVE RHEUMATOID FACTOR (HCC): Primary | ICD-10-CM

## 2021-05-13 DIAGNOSIS — K21.9 GASTROESOPHAGEAL REFLUX DISEASE, UNSPECIFIED WHETHER ESOPHAGITIS PRESENT: ICD-10-CM

## 2021-05-13 DIAGNOSIS — Z13.220 LIPID SCREENING: ICD-10-CM

## 2021-05-13 DIAGNOSIS — N83.201 RIGHT OVARIAN CYST: ICD-10-CM

## 2021-05-13 DIAGNOSIS — K43.9 VENTRAL HERNIA WITHOUT OBSTRUCTION OR GANGRENE: ICD-10-CM

## 2021-05-13 DIAGNOSIS — L03.116 CELLULITIS OF LEFT LEG: ICD-10-CM

## 2021-05-13 DIAGNOSIS — E55.9 VITAMIN D DEFICIENCY: ICD-10-CM

## 2021-05-13 PROCEDURE — G8417 CALC BMI ABV UP PARAM F/U: HCPCS | Performed by: FAMILY MEDICINE

## 2021-05-13 PROCEDURE — 4004F PT TOBACCO SCREEN RCVD TLK: CPT | Performed by: FAMILY MEDICINE

## 2021-05-13 PROCEDURE — 99214 OFFICE O/P EST MOD 30 MIN: CPT | Performed by: FAMILY MEDICINE

## 2021-05-13 PROCEDURE — G8427 DOCREV CUR MEDS BY ELIG CLIN: HCPCS | Performed by: FAMILY MEDICINE

## 2021-05-13 RX ORDER — HYDROXYCHLOROQUINE SULFATE 200 MG/1
200 TABLET, FILM COATED ORAL 2 TIMES DAILY
Qty: 60 TABLET | Refills: 2 | Status: SHIPPED
Start: 2021-05-13 | End: 2021-06-08 | Stop reason: SDUPTHER

## 2021-05-13 RX ORDER — SULFAMETHOXAZOLE AND TRIMETHOPRIM 800; 160 MG/1; MG/1
1 TABLET ORAL 2 TIMES DAILY
Qty: 20 TABLET | Refills: 0 | Status: SHIPPED
Start: 2021-05-13 | End: 2021-05-13 | Stop reason: SINTOL

## 2021-05-13 RX ORDER — AMOXICILLIN AND CLAVULANATE POTASSIUM 875; 125 MG/1; MG/1
1 TABLET, FILM COATED ORAL 2 TIMES DAILY
Qty: 20 TABLET | Refills: 0 | Status: SHIPPED | OUTPATIENT
Start: 2021-05-13 | End: 2021-05-23

## 2021-05-13 ASSESSMENT — PATIENT HEALTH QUESTIONNAIRE - PHQ9
1. LITTLE INTEREST OR PLEASURE IN DOING THINGS: 0
SUM OF ALL RESPONSES TO PHQ QUESTIONS 1-9: 0
2. FEELING DOWN, DEPRESSED OR HOPELESS: 0
SUM OF ALL RESPONSES TO PHQ QUESTIONS 1-9: 0

## 2021-05-13 ASSESSMENT — ENCOUNTER SYMPTOMS
SHORTNESS OF BREATH: 0
SORE THROAT: 0
DIARRHEA: 0
NAUSEA: 0
RHINORRHEA: 0
CONSTIPATION: 0
WHEEZING: 0
ABDOMINAL PAIN: 0
VOMITING: 0

## 2021-06-09 DIAGNOSIS — R22.2 MASS ON BACK: Primary | ICD-10-CM

## 2021-06-09 DIAGNOSIS — D17.1 LIPOMA OF TORSO: ICD-10-CM

## 2021-06-10 DIAGNOSIS — E55.9 VITAMIN D DEFICIENCY: Primary | ICD-10-CM

## 2021-06-10 RX ORDER — CHOLECALCIFEROL (VITAMIN D3) 50 MCG
4000 CAPSULE ORAL DAILY
Qty: 90 CAPSULE | Refills: 3 | Status: SHIPPED
Start: 2021-06-10 | End: 2021-11-29 | Stop reason: SDUPTHER

## 2021-06-18 ENCOUNTER — OFFICE VISIT (OUTPATIENT)
Dept: SURGERY | Age: 41
End: 2021-06-18
Payer: MEDICAID

## 2021-06-18 VITALS
WEIGHT: 293 LBS | SYSTOLIC BLOOD PRESSURE: 139 MMHG | OXYGEN SATURATION: 98 % | RESPIRATION RATE: 16 BRPM | HEIGHT: 67 IN | DIASTOLIC BLOOD PRESSURE: 90 MMHG | HEART RATE: 65 BPM | TEMPERATURE: 98.1 F | BODY MASS INDEX: 45.99 KG/M2

## 2021-06-18 DIAGNOSIS — M79.9 SOFT TISSUE LESION OF ELBOW REGION: ICD-10-CM

## 2021-06-18 DIAGNOSIS — D17.1 LIPOMA OF BACK: Primary | ICD-10-CM

## 2021-06-18 PROCEDURE — G8427 DOCREV CUR MEDS BY ELIG CLIN: HCPCS | Performed by: SURGERY

## 2021-06-18 PROCEDURE — 4004F PT TOBACCO SCREEN RCVD TLK: CPT | Performed by: SURGERY

## 2021-06-18 PROCEDURE — G8417 CALC BMI ABV UP PARAM F/U: HCPCS | Performed by: SURGERY

## 2021-06-18 PROCEDURE — 99213 OFFICE O/P EST LOW 20 MIN: CPT | Performed by: SURGERY

## 2021-06-18 RX ORDER — ARIPIPRAZOLE 5 MG/1
TABLET ORAL
COMMUNITY
Start: 2021-06-08 | End: 2022-02-02

## 2021-06-18 NOTE — PROGRESS NOTES
[facility] Surgery Clinic Note    Assessment/Plan:     Diagnosis Orders   1. Lipoma of back      We will plan for excision   2. Soft tissue lesion of elbow region      We will plan for simultaneous excision       Return for Surgery. Chief Complaint   Patient presents with    New Patient     here for lipoma on back. PCP: Kaylah Lombardo MD    HPI: Megan Rebolledo is a 36 y.o. female here for evaluation of a lesion on her back. Is been there for some time. It has been getting bigger lately. It causes her some discomfort. There is no significant radiation. She says it is in her upper mid back. It is never been drained. She had ultrasound showing likely lipoma. She also complains of a lesion on her left dorsal elbow. It also causes her discomfort. Review of Systems   All other systems reviewed and are negative. The remainder of the past medical, past surgical, family, and psychosocial history, as well as medication and allergy review, were completed and are as documented elsewhere in the chart. Objective:  Vitals:    06/18/21 1057   BP: (!) 139/90   Pulse: 65   Resp: 16   Temp: 98.1 °F (36.7 °C)   TempSrc: Temporal   SpO2: 98%   Weight: 294 lb 9.6 oz (133.6 kg)   Height: 5' 7\" (1.702 m)          Physical Exam  Constitutional:       General: She is not in acute distress. Appearance: She is not diaphoretic. Cardiovascular:      Rate and Rhythm: Normal rate. Pulmonary:      Effort: Pulmonary effort is normal. No respiratory distress. Abdominal:      General: There is no distension. Palpations: Abdomen is soft. Tenderness: There is no abdominal tenderness. There is no guarding or rebound. Skin:                     Malcolm Doherty MD  6/18/2021    NOTE: This report, in part or full, may have been transcribed using voice recognition software. Every effort was made to ensure accuracy; however, inadvertent computerized transcription errors may be present.  Please excuse any transcriptional grammatical or spelling errors that may have escaped my editorial review.       CC: Dhaval Franks MD

## 2021-06-21 ENCOUNTER — TELEPHONE (OUTPATIENT)
Dept: SURGERY | Age: 41
End: 2021-06-21

## 2021-06-21 NOTE — TELEPHONE ENCOUNTER
Jabari Cervantes is scheduled for excision back and lt elbow lipoma with Dr Courtney Alvarado on 07-19-21 at [facility] at 9:30 am. Patient was told to arrive at 8:30 am. Patient needs to be NPO after midnight the night before procedure. All surgery instructions were explained to the patient and a surgery letter was also mailed out. MA informed patient that PAT will also be calling to review pre-op instructions and medications. Patient verbalized understanding.   Electronically signed by Stephanie Hackett MA on 6/21/2021 at 2:32 PM

## 2021-06-21 NOTE — TELEPHONE ENCOUNTER
Prior Authorization Form:      DEMOGRAPHICS:                     Patient Name:  Kalin Light  Patient :  1980            Insurance:  Payor: [facility] / Plan: [facility] / Product Type: *No Product type* /   Insurance ID Number:    Payor/Plan Subscr  Sex Relation Sub.  Ins. ID Effective Group Num   1. [address] [facility] 1980 Female Self [id number] 10/1/18 [id number]                                   [address]         DIAGNOSIS & PROCEDURE:                       Procedure/Operation: Excision back and lt elbow lipomas           CPT Code: 025+24/56218    Diagnosis:  Lipoma back/lipoma lt elbow     ICD10 Code: D17.1/M79.9    Location:  [facility]    Surgeon:  Dr Tiff Abdi INFORMATION:                          Date: 21    Time: 9:30 am              [facility]                                                       Status:  Outpatient        Special Comments:         Electronically signed by Evelina Ng MA on 2021 at 2:35 PM

## 2021-07-13 NOTE — PROGRESS NOTES
Have you been tested for COVID  Yes           Have you been told you were positive for COVID No  Have you had any known exposure to someone that is positive for COVID No  Do you have a cough                   No              Do you have shortness of breath No                 Do you have a sore throat            No                Are you having chills                    No                Are you having muscle aches. No                    Please come to the hospital wearing a mask and have your significant other wear a mask as well. Both of you should check your temperature before leaving to come here,  if it is 100 or higher please call [phone] for instruction.
products on the day of surgery    [x] You can expect a call the business day prior to procedure to notify you if your arrival time changes    [x] If you receive a survey after surgery we would greatly appreciate your comments    [] Parent/guardian of a minor must accompany their child and remain on the premises  the entire time they are under our care     [] Pediatric patients may bring favorite toy, blanket or comfort item with them    [] A caregiver or family member must remain with the patient during their stay if they are mentally handicapped, have dementia, disoriented or unable to use a call light or would be a safety concern if left unattended    [x] Please notify surgeon if you develop any illness between now and time of surgery (cold, cough, sore throat, fever, nausea, vomiting) or any signs of infections  including skin, wounds, and dental.    [x]  The Outpatient Pharmacy is available to fill your prescription here on your day of surgery, ask your preop nurse for details    [] Other instructions    EDUCATIONAL MATERIALS PROVIDED:    [] PAT Preoperative Education Packet/Booklet     [] Medication List    [] Transfusion bracelet applied with instructions    [] Shower with soap, lather and rinse well, and use CHG wipes provided the evening before surgery as instructed    [] Incentive spirometer with instructions

## 2021-07-14 ENCOUNTER — HOSPITAL ENCOUNTER (OUTPATIENT)
Age: 41
Discharge: HOME | End: 2021-07-16
Payer: MEDICAID

## 2021-07-14 DIAGNOSIS — Z01.818 PREOP TESTING: ICD-10-CM

## 2021-07-14 PROCEDURE — U0003 INFECTIOUS AGENT DETECTION BY NUCLEIC ACID (DNA OR RNA); SEVERE ACUTE RESPIRATORY SYNDROME CORONAVIRUS 2 (SARS-COV-2) (CORONAVIRUS DISEASE [COVID-19]), AMPLIFIED PROBE TECHNIQUE, MAKING USE OF HIGH THROUGHPUT TECHNOLOGIES AS DESCRIBED BY CMS-2020-01-R: HCPCS

## 2021-07-14 PROCEDURE — U0005 INFEC AGEN DETEC AMPLI PROBE: HCPCS

## 2021-07-15 LAB — SARS-COV-2, PCR: NOT DETECTED

## 2021-07-19 ENCOUNTER — ANESTHESIA (OUTPATIENT)
Dept: OPERATING ROOM | Age: 41
End: 2021-07-19
Payer: MEDICAID

## 2021-07-19 ENCOUNTER — HOSPITAL ENCOUNTER (OUTPATIENT)
Age: 41
Setting detail: OUTPATIENT SURGERY
Discharge: HOME | End: 2021-07-19
Attending: SURGERY | Admitting: SURGERY
Payer: MEDICAID

## 2021-07-19 ENCOUNTER — ANESTHESIA EVENT (OUTPATIENT)
Dept: OPERATING ROOM | Age: 41
End: 2021-07-19
Payer: MEDICAID

## 2021-07-19 VITALS
DIASTOLIC BLOOD PRESSURE: 56 MMHG | RESPIRATION RATE: 16 BRPM | WEIGHT: 293 LBS | SYSTOLIC BLOOD PRESSURE: 114 MMHG | HEART RATE: 59 BPM | HEIGHT: 67 IN | TEMPERATURE: 97.7 F | OXYGEN SATURATION: 100 % | BODY MASS INDEX: 45.99 KG/M2

## 2021-07-19 VITALS
RESPIRATION RATE: 8 BRPM | DIASTOLIC BLOOD PRESSURE: 58 MMHG | SYSTOLIC BLOOD PRESSURE: 99 MMHG | TEMPERATURE: 98.2 F | OXYGEN SATURATION: 97 %

## 2021-07-19 DIAGNOSIS — Z01.818 PREOP TESTING: Primary | ICD-10-CM

## 2021-07-19 LAB
HCG, URINE, POC: NEGATIVE
Lab: NORMAL
NEGATIVE QC PASS/FAIL: NORMAL
POSITIVE QC PASS/FAIL: NORMAL

## 2021-07-19 PROCEDURE — 24075 EXC ARM/ELBOW LES SC < 3 CM: CPT | Performed by: SURGERY

## 2021-07-19 PROCEDURE — 21931 EXC BACK LES SC 3 CM/>: CPT | Performed by: SURGERY

## 2021-07-19 PROCEDURE — 6370000000 HC RX 637 (ALT 250 FOR IP): Performed by: SURGERY

## 2021-07-19 PROCEDURE — 2580000003 HC RX 258: Performed by: SURGERY

## 2021-07-19 PROCEDURE — 2709999900 HC NON-CHARGEABLE SUPPLY: Performed by: SURGERY

## 2021-07-19 PROCEDURE — 88312 SPECIAL STAINS GROUP 1: CPT

## 2021-07-19 PROCEDURE — 2500000003 HC RX 250 WO HCPCS: Performed by: NURSE ANESTHETIST, CERTIFIED REGISTERED

## 2021-07-19 PROCEDURE — 3700000001 HC ADD 15 MINUTES (ANESTHESIA): Performed by: SURGERY

## 2021-07-19 PROCEDURE — 7100000010 HC PHASE II RECOVERY - FIRST 15 MIN: Performed by: SURGERY

## 2021-07-19 PROCEDURE — 6360000002 HC RX W HCPCS: Performed by: SURGERY

## 2021-07-19 PROCEDURE — 6360000002 HC RX W HCPCS: Performed by: NURSE ANESTHETIST, CERTIFIED REGISTERED

## 2021-07-19 PROCEDURE — 2500000003 HC RX 250 WO HCPCS: Performed by: SURGERY

## 2021-07-19 PROCEDURE — 88305 TISSUE EXAM BY PATHOLOGIST: CPT

## 2021-07-19 PROCEDURE — 3700000000 HC ANESTHESIA ATTENDED CARE: Performed by: SURGERY

## 2021-07-19 PROCEDURE — 3600000012 HC SURGERY LEVEL 2 ADDTL 15MIN: Performed by: SURGERY

## 2021-07-19 PROCEDURE — 3600000002 HC SURGERY LEVEL 2 BASE: Performed by: SURGERY

## 2021-07-19 PROCEDURE — 7100000011 HC PHASE II RECOVERY - ADDTL 15 MIN: Performed by: SURGERY

## 2021-07-19 PROCEDURE — 88304 TISSUE EXAM BY PATHOLOGIST: CPT

## 2021-07-19 RX ORDER — BUPIVACAINE HYDROCHLORIDE AND EPINEPHRINE 2.5; 5 MG/ML; UG/ML
INJECTION, SOLUTION EPIDURAL; INFILTRATION; INTRACAUDAL; PERINEURAL PRN
Status: DISCONTINUED | OUTPATIENT
Start: 2021-07-19 | End: 2021-07-19 | Stop reason: ALTCHOICE

## 2021-07-19 RX ORDER — MIDAZOLAM HYDROCHLORIDE 1 MG/ML
INJECTION INTRAMUSCULAR; INTRAVENOUS PRN
Status: DISCONTINUED | OUTPATIENT
Start: 2021-07-19 | End: 2021-07-19 | Stop reason: SDUPTHER

## 2021-07-19 RX ORDER — FENTANYL CITRATE 50 UG/ML
25 INJECTION, SOLUTION INTRAMUSCULAR; INTRAVENOUS EVERY 5 MIN PRN
Status: DISCONTINUED | OUTPATIENT
Start: 2021-07-19 | End: 2021-07-19 | Stop reason: HOSPADM

## 2021-07-19 RX ORDER — LIDOCAINE HYDROCHLORIDE 20 MG/ML
INJECTION, SOLUTION EPIDURAL; INFILTRATION; INTRACAUDAL; PERINEURAL PRN
Status: DISCONTINUED | OUTPATIENT
Start: 2021-07-19 | End: 2021-07-19 | Stop reason: SDUPTHER

## 2021-07-19 RX ORDER — SODIUM CHLORIDE 0.9 % (FLUSH) 0.9 %
10 SYRINGE (ML) INJECTION EVERY 12 HOURS SCHEDULED
Status: DISCONTINUED | OUTPATIENT
Start: 2021-07-19 | End: 2021-07-19 | Stop reason: HOSPADM

## 2021-07-19 RX ORDER — DIPHENHYDRAMINE HYDROCHLORIDE 50 MG/ML
INJECTION INTRAMUSCULAR; INTRAVENOUS PRN
Status: DISCONTINUED | OUTPATIENT
Start: 2021-07-19 | End: 2021-07-19 | Stop reason: SDUPTHER

## 2021-07-19 RX ORDER — SODIUM CHLORIDE 9 MG/ML
25 INJECTION, SOLUTION INTRAVENOUS PRN
Status: DISCONTINUED | OUTPATIENT
Start: 2021-07-19 | End: 2021-07-19 | Stop reason: HOSPADM

## 2021-07-19 RX ORDER — DIPHENHYDRAMINE HYDROCHLORIDE 50 MG/ML
12.5 INJECTION INTRAMUSCULAR; INTRAVENOUS
Status: DISCONTINUED | OUTPATIENT
Start: 2021-07-19 | End: 2021-07-19 | Stop reason: HOSPADM

## 2021-07-19 RX ORDER — MEPERIDINE HYDROCHLORIDE 25 MG/ML
12.5 INJECTION INTRAMUSCULAR; INTRAVENOUS; SUBCUTANEOUS EVERY 10 MIN PRN
Status: DISCONTINUED | OUTPATIENT
Start: 2021-07-19 | End: 2021-07-19 | Stop reason: HOSPADM

## 2021-07-19 RX ORDER — PROPOFOL 10 MG/ML
INJECTION, EMULSION INTRAVENOUS PRN
Status: DISCONTINUED | OUTPATIENT
Start: 2021-07-19 | End: 2021-07-19 | Stop reason: SDUPTHER

## 2021-07-19 RX ORDER — PROMETHAZINE HYDROCHLORIDE 25 MG/ML
6.25 INJECTION, SOLUTION INTRAMUSCULAR; INTRAVENOUS PRN
Status: DISCONTINUED | OUTPATIENT
Start: 2021-07-19 | End: 2021-07-19 | Stop reason: HOSPADM

## 2021-07-19 RX ORDER — GINSENG 100 MG
CAPSULE ORAL PRN
Status: DISCONTINUED | OUTPATIENT
Start: 2021-07-19 | End: 2021-07-19 | Stop reason: ALTCHOICE

## 2021-07-19 RX ORDER — FENTANYL CITRATE 50 UG/ML
INJECTION, SOLUTION INTRAMUSCULAR; INTRAVENOUS PRN
Status: DISCONTINUED | OUTPATIENT
Start: 2021-07-19 | End: 2021-07-19 | Stop reason: SDUPTHER

## 2021-07-19 RX ORDER — SODIUM CHLORIDE 0.9 % (FLUSH) 0.9 %
10 SYRINGE (ML) INJECTION PRN
Status: DISCONTINUED | OUTPATIENT
Start: 2021-07-19 | End: 2021-07-19 | Stop reason: HOSPADM

## 2021-07-19 RX ADMIN — PROPOFOL 30 MG: 10 INJECTION, EMULSION INTRAVENOUS at 09:37

## 2021-07-19 RX ADMIN — PROPOFOL 20 MG: 10 INJECTION, EMULSION INTRAVENOUS at 09:55

## 2021-07-19 RX ADMIN — PROPOFOL 30 MG: 10 INJECTION, EMULSION INTRAVENOUS at 09:23

## 2021-07-19 RX ADMIN — PROPOFOL 20 MG: 10 INJECTION, EMULSION INTRAVENOUS at 10:02

## 2021-07-19 RX ADMIN — PROPOFOL 20 MG: 10 INJECTION, EMULSION INTRAVENOUS at 09:48

## 2021-07-19 RX ADMIN — FENTANYL CITRATE 25 MCG: 50 INJECTION, SOLUTION INTRAMUSCULAR; INTRAVENOUS at 09:50

## 2021-07-19 RX ADMIN — FENTANYL CITRATE 50 MCG: 50 INJECTION, SOLUTION INTRAMUSCULAR; INTRAVENOUS at 09:23

## 2021-07-19 RX ADMIN — DIPHENHYDRAMINE HYDROCHLORIDE 50 MG: 50 INJECTION, SOLUTION INTRAMUSCULAR; INTRAVENOUS at 09:22

## 2021-07-19 RX ADMIN — PROPOFOL 40 MG: 10 INJECTION, EMULSION INTRAVENOUS at 09:42

## 2021-07-19 RX ADMIN — PROPOFOL 30 MG: 10 INJECTION, EMULSION INTRAVENOUS at 09:45

## 2021-07-19 RX ADMIN — FENTANYL CITRATE 50 MCG: 50 INJECTION, SOLUTION INTRAMUSCULAR; INTRAVENOUS at 09:44

## 2021-07-19 RX ADMIN — MIDAZOLAM 2 MG: 1 INJECTION INTRAMUSCULAR; INTRAVENOUS at 09:21

## 2021-07-19 RX ADMIN — CEFAZOLIN 3000 MG: 10 INJECTION, POWDER, FOR SOLUTION INTRAVENOUS at 09:30

## 2021-07-19 RX ADMIN — FENTANYL CITRATE 50 MCG: 50 INJECTION, SOLUTION INTRAMUSCULAR; INTRAVENOUS at 09:58

## 2021-07-19 RX ADMIN — FENTANYL CITRATE 25 MCG: 50 INJECTION, SOLUTION INTRAMUSCULAR; INTRAVENOUS at 09:55

## 2021-07-19 RX ADMIN — SODIUM CHLORIDE: 9 INJECTION, SOLUTION INTRAVENOUS at 09:18

## 2021-07-19 RX ADMIN — LIDOCAINE HYDROCHLORIDE 40 MG: 20 INJECTION, SOLUTION EPIDURAL; INFILTRATION; INTRACAUDAL; PERINEURAL at 09:23

## 2021-07-19 RX ADMIN — PROPOFOL 40 MG: 10 INJECTION, EMULSION INTRAVENOUS at 09:25

## 2021-07-19 ASSESSMENT — LIFESTYLE VARIABLES: SMOKING_STATUS: 1

## 2021-07-19 NOTE — OP NOTE
Operative Note      Patient: Joseph Hogan  YOB: 1980  MRN: [id number]    Date of Procedure: 7/19/2021    Pre-Op Diagnosis: LEFT ELBOW AND BACK LIPOMA    Post-Op Diagnosis: Same       Procedure(s):  EXCISION LEFT ELBOW LESION  EXCISION BACK LIPOMA    Surgeon(s):  Zeeshan Rojas MD    Assistant:   * No surgical staff found *    Anesthesia: Monitor Anesthesia Care    Estimated Blood Loss (mL): Minimal    Complications: None    Specimens:   ID Type Source Tests Collected by Time Destination   A : LEFT ELBOW LESION, MARKING SUTURE SHORT-INFERIOR, LONG OUTER Tissue Tissue SURGICAL PATHOLOGY Zeeshan Rojas MD 7/19/2021 2175    B : LIPOMA OF BACK Tissue Tissue SURGICAL PATHOLOGY Zeeshan Rojas MD 7/19/2021 1002        Implants:  * No implants in log *      Drains: * No LDAs found *    Findings: Back lipoma measuring 4 cm x 5 cm, Left elbow lipoma measuring 1.5 cm x 2 cm     Detailed Description of Procedure: The patient was then taken to the operating room and positioned in the right lateral position on the operating table. Pneumatic compression devices were placed on the lower extremities bilaterally. The patient was placed under [facility]. The upper back and left elbow were prepped with ChloraPrep and draped in the usual sterile fashion. Attention was turned to the left elbow. An elliptical incision with a  #15-blade scalpel was made overlying the skin lesion. Sharp dissection with electrocautery was used to dissect anterior, superior, medial, lateral and inferior margins of the lesion. Dissection carried to the subcutaneous tissue and once under it, it was removed and measured to be 1.5 cm x 2 cm. Meticulous hemostasis was achieved at the surgical site using careful electrocautery. The surgical site was irrigated with sterile saline. There was no further bleeding. Multiple interrupted 3-0 nylon sutures were used to approximate the skin. Bacitracin was applied and a sterile dressing was placed. Attention was then turned to the upper back. A transverse incision with a #15-blade scalpel was made overlying the skin lesion. Sharp dissection with electrocautery was used to dissect anterior, superior, medial, lateral and inferior margins of the lesion down through the subcutaneous tissue. It was removed and measured to be 4 cm x 5 cm. It was more amorphous then clearly defined. Once it was fully removed, no residual mass was noted and appeared that we were in a well-defined cavity. No deeper mass was noted either. Meticulous hemostasis was achieved at the surgical site using careful electrocautery. The surgical site was irrigated with sterile saline. There was no further bleeding. Deep dermal sutures using 3-0 Vicryl were then placed to close the deep space and to reapproximate the skin. A 4-0 vicryl was then used to close the skin. The skin was cleaned and dried and covered with skin glue. The patient tolerated the procedure well. There were no complications. The patient will be observed in the postanesthesia care unit and then discharged home when criteria are met. Dr. Tessa Sweet was scrubbed and present for the entirety of the procedure. Electronically signed by Aparna Brown MD on 7/19/2021 at 10:16 AM     Attestation:    I was present during the procedure and participated in all gillis aspects.     Efe Witt MD  07/20/21  8:02 AM

## 2021-07-19 NOTE — ANESTHESIA POSTPROCEDURE EVALUATION
Department of Anesthesiology  Postprocedure Note    Patient: Bettie Boss  MRN: [id number]  YOB: 1980  Date of evaluation: 7/19/2021  Time:  12:45 PM     Procedure Summary     Date: 07/19/21 Room / Location: [facility] OR [address] [place] [address]    Anesthesia Start: 9143 Anesthesia Stop: 7386    Procedures:       EXCISION LEFT ELBOW LESION (Left Elbow)      EXCISION BACK LIPOMA (N/A Back) Diagnosis: (LEFT ELBOW LIPOMA BACK LIPOMA)    Surgeons: Trisha De Leon MD Responsible Provider: Jd Ojeda MD    Anesthesia Type: MAC ASA Status: 3          Anesthesia Type: MAC    Corina Phase I: Corina Score: 10    Corina Phase II: Corina Score: 10    Last vitals: Reviewed and per EMR flowsheets.        Anesthesia Post Evaluation    Patient location during evaluation: PACU  Patient participation: complete - patient participated  Level of consciousness: awake and alert  Pain score: 0  Airway patency: patent  Nausea & Vomiting: no nausea and no vomiting  Complications: no  Cardiovascular status: hemodynamically stable and blood pressure returned to baseline  Respiratory status: acceptable  Hydration status: euvolemic

## 2021-07-19 NOTE — H&P
GENERAL SURGERY  HISTORY AND PHYSICAL  7/19/2021    No chief complaint on file. KATHRYN Johnson is a 36 y.o. female here for evaluation of a lesion on her back. Is been there for some time. It has been getting bigger lately. It causes her some discomfort. There is no significant radiation. She says it is in her upper mid back. It is never been drained. She had ultrasound showing likely lipoma. She also complains of a lesion on her left dorsal elbow. It also causes her discomfort. Past Medical History:   Diagnosis Date    Anxiety     Depression     Lipoma of arm     Lipoma of back     [facility]umat[facility] ([facility]     Rheumatoid arthritis ([facility] 2018       Past Surgical History:   Procedure Laterality Date    ANKLE FRACTURE SURGERY Right     has plate and pins    CHOLECYSTECTOMY      TONSILLECTOMY      TUBAL LIGATION         Prior to Admission medications    Medication Sig Start Date End Date Taking?  Authorizing Provider   ARIPiprazole (ABILIFY) 5 MG tablet take 1 tablet by mouth every evening at bedtime 6/8/21  Yes Historical Provider, MD   Cholecalciferol ( VITAMIN D3) 100 MCG (4000 UT) CAPS Take 4,000 Units by mouth daily 6/10/21  Yes Wilma Dickson MD   meloxicam HOSSEIN MALAVE [facility]) 15 MG tablet Take 1 tablet by mouth daily 6/9/21 9/7/21 Yes Wilma Dickson MD   folic acid (FOLVITE) 1 MG tablet Take 1 tablet by mouth daily 6/9/21  Yes Wilma Dickson MD   sertraline (ZOLOFT) 50 MG tablet take 1 tablet by mouth once daily 6/9/21  Yes Wilma Dickson MD   hydroxychloroquine (PLAQUENIL) 200 MG tablet Take 1 tablet by mouth 2 times daily 6/9/21  Yes Wilma Dickson MD   methotrexate (RHEUMATREX) 2.5 MG chemo tablet Take 8 tablets by mouth once a week 6/9/21 9/7/21 Yes Wilma Dickson MD       Allergies   Allergen Reactions    Penicillins     Cefaclor Hives     UNKNOWN REACTION    Codeine Hives     Other reaction(s): Hives    Erythromycin Rash     Other reaction(s): Hives    Tylenol 8 Hour [Acetaminophen] Hives     Tylenol #3 with Codeine       Family History   Problem Relation Age of Onset    Hypertension Mother        Social History     Tobacco Use    Smoking status: Current Every Day Smoker     Packs/day: 1.00     Years: 2.00     Pack years: 2.00     Types: Cigarettes    Smokeless tobacco: Never Used   Vaping Use    Vaping Use: Never used   Substance Use Topics    Alcohol use: No    Drug use: Never         Review of Systems: pertinent ROS listed in HPI, all others negative       PHYSICAL EXAM:    Vitals:    07/19/21 0735   BP: 132/86   Pulse: 76   Resp: 18   Temp: 96.9 °F (36.1 °C)   SpO2: 97%       GENERAL:  NAD. A&Ox3. HEAD:  Normocephalic, atraumatic. EYES:   No scleral icterus. PERRLA. LUNGS:  No increased work of breathing. CARDIOVASCULAR: RR  ABDOMEN:  Soft, non-distended, non-tender. No guarding, rigidity, rebound. SKIN: 2.5 cm soft mobile subcutaneous lesion consistent with lipoma on the middle of back, 1 cm subcutaneous nodule, mobile and not connected to the joint on elbow      ASSESSMENT/PLAN:  36 y.o. female with lipoma of back and soft tissue lesion of elbow region.      - OR today for simultaneous excision     Plan discussed with Dr. Ben King MD  7/19/2021  8:01 AM

## 2021-07-19 NOTE — ANESTHESIA PRE PROCEDURE
Department of Anesthesiology  Preprocedure Note       Name:  William Quiroga   Age:  36 y.o.  :  1980                                          MRN:  [id number]         Date:  2021      Surgeon: Scarlet Gonzalez):  Derek Casas MD    Procedure: Procedure(s):  EXCISION LEFT ELBOW LIPOMA  EXCISION BACK LIPOMA    Medications prior to admission:   Prior to Admission medications    Medication Sig Start Date End Date Taking? Authorizing Provider   ARIPiprazole (ABILIFY) 5 MG tablet take 1 tablet by mouth every evening at bedtime 21  Yes Historical Provider, MD   Cholecalciferol (HM VITAMIN D3) 100 MCG (4000 UT) CAPS Take 4,000 Units by mouth daily 6/10/21  Yes Alisha Weller MD   meloxicam HOSSEIN MALAVE [facility]) 15 MG tablet Take 1 tablet by mouth daily 21 Yes Alisha Weller MD   folic acid (FOLVITE) 1 MG tablet Take 1 tablet by mouth daily 21  Yes Alisha Weller MD   sertraline (ZOLOFT) 50 MG tablet take 1 tablet by mouth once daily 21  Yes Alisha Weller MD   hydroxychloroquine (PLAQUENIL) 200 MG tablet Take 1 tablet by mouth 2 times daily 21  Yes Alisha Weller MD   methotrexate (RHEUMATREX) 2.5 MG chemo tablet Take 8 tablets by mouth once a week 21 Yes Alisha Weller MD       Current medications:    Current Facility-Administered Medications   Medication Dose Route Frequency Provider Last Rate Last Admin    sodium chloride flush 0.9 % injection 10 mL  10 mL Intravenous 2 times per day Derek Casas MD        sodium chloride flush 0.9 % injection 10 mL  10 mL Intravenous PRN Derek Casas MD        0.9 % sodium chloride infusion  25 mL Intravenous PRN Derek Casas MD        ceFAZolin (ANCEF) 3,000 mg in dextrose 5 % 100 mL IVPB  3,000 mg Intravenous On Call to  Marloo Street, MD           Allergies:     Allergies   Allergen Reactions    Penicillins     Cefaclor Hives     UNKNOWN REACTION    Codeine Hives     Other reaction(s): Hives    Erythromycin Rash Other reaction(s): Hives    Tylenol 8 Hour [Acetaminophen] Hives     Tylenol #3 with Codeine       Problem List:    Patient Active Problem List   Diagnosis Code    Anxiety F41.9    Elevated uric acid in blood E79.0    PRUDENCIO positive R76.8    Rheumatoid arthritis involving multiple sites with positive rheumatoid factor (HCC) M05.79    Class 3 severe obesity due to excess calories with serious comorbidity and body mass index (BMI) of 40.0 to 44.9 in adult ([facility] E66.01, Z68.41    Gastroesophageal reflux disease K21.9    Ventral hernia K43.9    Mass on back R22.2    Right ovarian cyst N83.201       Past Medical History:        Diagnosis Date    Anxiety     Depression     Lipoma of arm     Lipoma of back     [facility] ([facility]     Rheumatoid arthritis ([facility] 2018       Past Surgical History:        Procedure Laterality Date    ANKLE FRACTURE SURGERY Right     has plate and pins    CHOLECYSTECTOMY      TONSILLECTOMY      TUBAL LIGATION         Social History:    Social History     Tobacco Use    Smoking status: Current Every Day Smoker     Packs/day: 1.00     Years: 2.00     Pack years: 2.00     Types: Cigarettes    Smokeless tobacco: Never Used   Substance Use Topics    Alcohol use: No                                Ready to quit: Not Answered  Counseling given: Not Answered      Vital Signs (Current):   Vitals:    07/13/21 1452 07/19/21 0730   Height: 5' 7\" (1.702 m) 5' 7\" (1.702 m)                                              BP Readings from Last 3 Encounters:   06/18/21 (!) 139/90   05/13/21 130/84   09/28/20 102/74       NPO Status:                                                                                 BMI:   Wt Readings from Last 3 Encounters:   06/18/21 294 lb 9.6 oz (133.6 kg)   05/13/21 290 lb (131.5 kg)   09/28/20 279 lb (126.6 kg)     Body mass index is 46.14 kg/m².     CBC:   Lab Results   Component Value Date    WBC 7.2 06/08/2021    RBC 4.78 06/08/2021    HGB 13.9 06/08/2021    HCT 43.5 06/08/2021    MCV 91.0 06/08/2021    RDW 14.0 06/08/2021     06/08/2021       CMP:   Lab Results   Component Value Date     06/08/2021    K 4.6 06/08/2021     06/08/2021    CO2 21 06/08/2021    BUN 16 06/08/2021    CREATININE 0.8 06/08/2021    GFRAA >60 06/08/2021    LABGLOM >60 06/08/2021    GLUCOSE 89 06/08/2021    PROT 6.9 06/08/2021    CALCIUM 9.2 06/08/2021    BILITOT <0.2 06/08/2021    ALKPHOS 69 06/08/2021    AST 10 06/08/2021    ALT 12 06/08/2021       POC Tests: No results for input(s): POCGLU, POCNA, POCK, POCCL, POCBUN, POCHEMO, POCHCT in the last 72 hours. Coags: No results found for: PROTIME, INR, APTT    HCG (If Applicable):   Lab Results   Component Value Date    PREGTESTUR negative 08/02/2016        ABGs: No results found for: PHART, PO2ART, YYY3FCX, EZR4RZM, BEART, A0VEYSMQ     Type & Screen (If Applicable):  No results found for: LABABO, LABRH    Drug/Infectious Status (If Applicable):  No results found for: HIV, HEPCAB    COVID-19 Screening (If Applicable):   Lab Results   Component Value Date    COVID19 Not Detected 07/14/2021           Anesthesia Evaluation  Patient summary reviewed and Nursing notes reviewed no history of anesthetic complications:   Airway: Mallampati: III  TM distance: >3 FB   Neck ROM: full  Mouth opening: > = 3 FB Dental: normal exam         Pulmonary:normal exam    (+) current smoker          Patient smoked on day of surgery. Cardiovascular:Negative CV ROS  Exercise tolerance: good (>4 METS),           Rhythm: regular  Rate: normal           Beta Blocker:  Not on Beta Blocker         Neuro/Psych:   (+) depression/anxiety             GI/Hepatic/Renal:   (+) GERD: well controlled,           Endo/Other:    (+) : arthritis: rheumatoid. , .                 Abdominal:             Vascular: negative vascular ROS.          Other Findings:             Anesthesia Plan      MAC     ASA 3       Induction: intravenous. Anesthetic plan and risks discussed with patient.                       Sami Marrero MD   7/19/2021

## 2021-08-04 ENCOUNTER — TELEPHONE (OUTPATIENT)
Dept: SURGERY | Age: 41
End: 2021-08-04

## 2021-08-04 NOTE — TELEPHONE ENCOUNTER
Patient called and stated that her incision broke open and is draining, no redness, temperature or pain. MA spoke with Dr Clementine Robledo who instructed patient to cover with gauze and he will look at incision when she comes into her scheduled appointment tomorrow. Patient understood instructions.   Electronically signed by Raeann Farris MA on 8/4/2021 at 3:55 PM

## 2021-08-05 ENCOUNTER — OFFICE VISIT (OUTPATIENT)
Dept: SURGERY | Age: 41
End: 2021-08-05

## 2021-08-05 VITALS
SYSTOLIC BLOOD PRESSURE: 124 MMHG | RESPIRATION RATE: 20 BRPM | HEART RATE: 80 BPM | DIASTOLIC BLOOD PRESSURE: 81 MMHG | HEIGHT: 67 IN | TEMPERATURE: 98.3 F | WEIGHT: 293 LBS | BODY MASS INDEX: 45.99 KG/M2

## 2021-08-05 DIAGNOSIS — Z86.018 STATUS POST EXCISION OF LIPOMA: Primary | ICD-10-CM

## 2021-08-05 DIAGNOSIS — Z98.890 STATUS POST EXCISION OF LIPOMA: Primary | ICD-10-CM

## 2021-08-05 PROCEDURE — 99024 POSTOP FOLLOW-UP VISIT: CPT | Performed by: SURGERY

## 2021-08-05 RX ORDER — SULFAMETHOXAZOLE AND TRIMETHOPRIM 800; 160 MG/1; MG/1
1 TABLET ORAL 2 TIMES DAILY
Qty: 20 TABLET | Refills: 0 | Status: SHIPPED | OUTPATIENT
Start: 2021-08-05 | End: 2021-08-15

## 2021-08-05 NOTE — PROGRESS NOTES
Every effort was made to ensure accuracy; however, inadvertent computerized transcription errors may be present. Please excuse any transcriptional grammatical or spelling errors that may have escaped my editorial review.       CC: Bo Belcher MD

## 2021-08-27 DIAGNOSIS — F41.9 ANXIETY: ICD-10-CM

## 2021-10-06 DIAGNOSIS — F41.9 ANXIETY: ICD-10-CM

## 2021-11-06 DIAGNOSIS — F41.9 ANXIETY: ICD-10-CM

## 2021-11-29 DIAGNOSIS — M05.79 RHEUMATOID ARTHRITIS INVOLVING MULTIPLE SITES WITH POSITIVE RHEUMATOID FACTOR (HCC): ICD-10-CM

## 2021-11-29 DIAGNOSIS — E55.9 VITAMIN D DEFICIENCY: ICD-10-CM

## 2021-11-29 DIAGNOSIS — F41.9 ANXIETY: ICD-10-CM

## 2021-11-29 RX ORDER — MELOXICAM 15 MG/1
15 TABLET ORAL DAILY
Qty: 90 TABLET | Refills: 1 | Status: SHIPPED
Start: 2021-11-29 | End: 2022-04-27 | Stop reason: SDUPTHER

## 2021-11-29 RX ORDER — CHOLECALCIFEROL (VITAMIN D3) 50 MCG
4000 CAPSULE ORAL DAILY
Qty: 90 CAPSULE | Refills: 3 | Status: SHIPPED
Start: 2021-11-29 | End: 2021-12-20 | Stop reason: SDUPTHER

## 2021-11-29 RX ORDER — HYDROXYCHLOROQUINE SULFATE 200 MG/1
200 TABLET, FILM COATED ORAL 2 TIMES DAILY
Qty: 60 TABLET | Refills: 2 | Status: SHIPPED
Start: 2021-11-29 | End: 2022-02-02 | Stop reason: SDUPTHER

## 2021-12-01 ENCOUNTER — TELEPHONE (OUTPATIENT)
Dept: PRIMARY CARE CLINIC | Age: 41
End: 2021-12-01

## 2021-12-01 NOTE — TELEPHONE ENCOUNTER
The pt is calling to ask you if you think it would be okay if she got one of the covid vaccines with the medications she is taking

## 2021-12-02 DIAGNOSIS — M05.79 RHEUMATOID ARTHRITIS INVOLVING MULTIPLE SITES WITH POSITIVE RHEUMATOID FACTOR (HCC): ICD-10-CM

## 2021-12-02 RX ORDER — FOLIC ACID 1 MG/1
TABLET ORAL
Qty: 90 TABLET | Refills: 1 | Status: SHIPPED | OUTPATIENT
Start: 2021-12-02

## 2021-12-20 DIAGNOSIS — E55.9 VITAMIN D DEFICIENCY: ICD-10-CM

## 2021-12-20 DIAGNOSIS — M05.79 RHEUMATOID ARTHRITIS INVOLVING MULTIPLE SITES WITH POSITIVE RHEUMATOID FACTOR (HCC): ICD-10-CM

## 2021-12-22 RX ORDER — CHOLECALCIFEROL (VITAMIN D3) 50 MCG
4000 CAPSULE ORAL DAILY
Qty: 90 CAPSULE | Refills: 3 | Status: SHIPPED
Start: 2021-12-22 | End: 2022-08-10 | Stop reason: SDUPTHER

## 2021-12-22 NOTE — TELEPHONE ENCOUNTER
Pharmacy calling insurance does not want to allow more than one tab of vitamin d 1,000 daily would want to change to the 5,000 unit daily?

## 2022-02-02 ENCOUNTER — OFFICE VISIT (OUTPATIENT)
Dept: PRIMARY CARE CLINIC | Age: 42
End: 2022-02-02
Payer: MEDICAID

## 2022-02-02 VITALS
BODY MASS INDEX: 45.99 KG/M2 | TEMPERATURE: 97.5 F | HEART RATE: 92 BPM | SYSTOLIC BLOOD PRESSURE: 122 MMHG | OXYGEN SATURATION: 98 % | HEIGHT: 67 IN | RESPIRATION RATE: 20 BRPM | WEIGHT: 293 LBS | DIASTOLIC BLOOD PRESSURE: 84 MMHG

## 2022-02-02 DIAGNOSIS — M05.79 RHEUMATOID ARTHRITIS INVOLVING MULTIPLE SITES WITH POSITIVE RHEUMATOID FACTOR (HCC): ICD-10-CM

## 2022-02-02 DIAGNOSIS — Z13.220 LIPID SCREENING: ICD-10-CM

## 2022-02-02 DIAGNOSIS — M54.16 RIGHT LUMBAR RADICULOPATHY: ICD-10-CM

## 2022-02-02 DIAGNOSIS — E78.2 MIXED HYPERLIPIDEMIA: ICD-10-CM

## 2022-02-02 DIAGNOSIS — M06.09 RHEUMATOID ARTHRITIS OF MULTIPLE SITES WITH NEGATIVE RHEUMATOID FACTOR (HCC): ICD-10-CM

## 2022-02-02 DIAGNOSIS — F41.9 ANXIETY: ICD-10-CM

## 2022-02-02 DIAGNOSIS — E66.01 CLASS 3 SEVERE OBESITY DUE TO EXCESS CALORIES WITH SERIOUS COMORBIDITY AND BODY MASS INDEX (BMI) OF 40.0 TO 44.9 IN ADULT (HCC): ICD-10-CM

## 2022-02-02 DIAGNOSIS — M16.11 ARTHRITIS OF RIGHT HIP: ICD-10-CM

## 2022-02-02 DIAGNOSIS — E55.9 VITAMIN D DEFICIENCY: ICD-10-CM

## 2022-02-02 DIAGNOSIS — M25.551 RIGHT HIP PAIN: Primary | ICD-10-CM

## 2022-02-02 DIAGNOSIS — K21.9 GASTROESOPHAGEAL REFLUX DISEASE, UNSPECIFIED WHETHER ESOPHAGITIS PRESENT: ICD-10-CM

## 2022-02-02 DIAGNOSIS — K43.9 VENTRAL HERNIA WITHOUT OBSTRUCTION OR GANGRENE: ICD-10-CM

## 2022-02-02 PROCEDURE — G8484 FLU IMMUNIZE NO ADMIN: HCPCS | Performed by: FAMILY MEDICINE

## 2022-02-02 PROCEDURE — G8417 CALC BMI ABV UP PARAM F/U: HCPCS | Performed by: FAMILY MEDICINE

## 2022-02-02 PROCEDURE — G8427 DOCREV CUR MEDS BY ELIG CLIN: HCPCS | Performed by: FAMILY MEDICINE

## 2022-02-02 PROCEDURE — 99214 OFFICE O/P EST MOD 30 MIN: CPT | Performed by: FAMILY MEDICINE

## 2022-02-02 PROCEDURE — 4004F PT TOBACCO SCREEN RCVD TLK: CPT | Performed by: FAMILY MEDICINE

## 2022-02-02 RX ORDER — HYDROXYCHLOROQUINE SULFATE 200 MG/1
200 TABLET, FILM COATED ORAL 2 TIMES DAILY
Qty: 60 TABLET | Refills: 2 | Status: SHIPPED
Start: 2022-02-02 | End: 2022-05-31

## 2022-02-02 ASSESSMENT — PATIENT HEALTH QUESTIONNAIRE - PHQ9
SUM OF ALL RESPONSES TO PHQ QUESTIONS 1-9: 0
1. LITTLE INTEREST OR PLEASURE IN DOING THINGS: 0
SUM OF ALL RESPONSES TO PHQ9 QUESTIONS 1 & 2: 0
SUM OF ALL RESPONSES TO PHQ QUESTIONS 1-9: 0
2. FEELING DOWN, DEPRESSED OR HOPELESS: 0
SUM OF ALL RESPONSES TO PHQ QUESTIONS 1-9: 0
SUM OF ALL RESPONSES TO PHQ QUESTIONS 1-9: 0

## 2022-02-02 ASSESSMENT — ENCOUNTER SYMPTOMS
WHEEZING: 0
SORE THROAT: 0
SHORTNESS OF BREATH: 0
RHINORRHEA: 0
ABDOMINAL PAIN: 0
VOMITING: 0
NAUSEA: 0
DIARRHEA: 0
CONSTIPATION: 0

## 2022-02-02 NOTE — PROGRESS NOTES
2022     Chief Complaint   Patient presents with    Medication Refill     HPI  Aziza Farrell (:  1980) is a 39 y.o. female, here for evaluation of the following medical concerns:    Patient is presenting today for a follow-up appointment in regards to her rheumatoid arthritis. Patient is currently on Plaquenil. However, patient has not been able to achieve significant relief of her chronic aches and pains. Has been restarted and titrated up on her methotrexate to  [facility] folic acid 1 mg daily. She has been on this medication in the past and the only side effect was mild thinning of her hair. She reports she has been tolerating this medication well. She did not obtain her repeat CBC and CMP.       Patient's PPD was negative prior to initiation of her medications.     Patient needs CBC, CMP every 3 months     Ovarian cyst: Right side, resolved on recent US. No recent issues.     Tubal Ligation Reversal:  Patient is yet to complete such.     GERD: Stable off medications     Anxiety: Patient has been well controlled on Zoloft 50 mg daily. Patient has any SI/HI. Follow psychiatry.     Obesity: Patient does not regularly follow a diet or exercise regimen. Was referred to bariatrics in the past.      Ventral Hernia: No issues.      Back Mass: Status post removal by surgery.     HM: PNA, Varicella titer. Review of Systems   Constitutional: Negative for chills and fever. HENT: Negative for congestion, rhinorrhea and sore throat. Respiratory: Negative for shortness of breath and wheezing. Cardiovascular: Negative for chest pain and leg swelling. Gastrointestinal: Negative for abdominal pain, constipation, diarrhea, nausea and vomiting. Musculoskeletal: Positive for arthralgias. Skin: Negative for rash. Neurological: Positive for numbness. Negative for light-headedness and headaches.        Past Medical History:   Diagnosis Date    Anxiety     Depression     Lipoma of arm     Lipoma of back     [facility]     Rheumatoid arthritis ([facility] 2018       Prior to Visit Medications    Medication Sig Taking? Authorizing Provider   methotrexate (RHEUMATREX) 2.5 MG chemo tablet Take 8 tablets once weekly. Yes Roe Colón MD   hydroxychloroquine (PLAQUENIL) 200 MG tablet Take 1 tablet by mouth 2 times daily Yes Roe Colón MD   sertraline (ZOLOFT) 50 MG tablet Take 1 tablet by mouth daily Yes Roe Colón MD   vitamin D-3 (CHOLECALCIFEROL) 125 MCG (5000 UT) TABS Take 1 tablet by mouth daily Yes Roe Colón MD   Cholecalciferol (HM VITAMIN D3) 100 MCG (4000 UT) CAPS Take 4,000 Units by mouth daily Yes Roe Colón MD   folic acid (FOLVITE) 1 MG tablet take 1 tablet by mouth once daily Yes Roe Colón MD   meloxicam (MOBIC) 15 MG tablet Take 1 tablet by mouth daily Yes Roe Colón MD        Allergies   Allergen Reactions    Penicillins     Cefaclor Hives     UNKNOWN REACTION    Codeine Hives     Other reaction(s): Hives    Erythromycin Rash     Other reaction(s): Hives    Tylenol 8 Hour [Acetaminophen] Hives     Tylenol #3 with Codeine       Social History     Tobacco Use    Smoking status: Current Every Day Smoker     Packs/day: 1.00     Years: 2.00     Pack years: 2.00     Types: Cigarettes    Smokeless tobacco: Never Used   Substance Use Topics    Alcohol use: No           Vitals:    02/02/22 0825   BP: 122/84   Pulse: 92   Resp: 20   Temp: 97.5 °F (36.4 °C)   TempSrc: Temporal   SpO2: 98%   Weight: (!) 307 lb (139.3 kg)   Height: 5' 7\" (1.702 m)     Estimated body mass index is 48.08 kg/m² as calculated from the following:    Height as of this encounter: 5' 7\" (1.702 m). Weight as of this encounter: 307 lb (139.3 kg). Physical Exam  Constitutional:       Appearance: She is well-developed. HENT:      Head: Normocephalic. Eyes:      Extraocular Movements: Extraocular movements intact.       Conjunctiva/sclera: Conjunctivae normal.   Cardiovascular:      Rate and Rhythm: Normal rate and regular rhythm. Heart sounds: Normal heart sounds. No murmur heard. Pulmonary:      Effort: Pulmonary effort is normal.      Breath sounds: Normal breath sounds. No wheezing or rales. Abdominal:      General: Bowel sounds are normal.      Palpations: Abdomen is soft. Tenderness: There is no abdominal tenderness. Musculoskeletal:      Right hip: Tenderness present. Comments: No significant lower back pain. Straight leg raise are negative bilaterally. Mild bilateral lower extremity lymphedema. Neurological:      General: No focal deficit present. Mental Status: She is alert. Comments: Cranial nerves grossly intact   Psychiatric:         Mood and Affect: Mood normal.         Judgment: Judgment normal.         ASSESSMENT/PLAN:  Savanah Haque was seen today for medication refill. Diagnoses and all orders for this visit:    Right hip pain  -     XR HIP RIGHT (2-3 VIEWS); Future  Consider referral for physical therapy or and or referral to orthopedic surgery based on findings. Rheumatoid arthritis involving multiple sites with positive rheumatoid factor (HCC)  -     methotrexate (RHEUMATREX) 2.5 MG chemo tablet; Take 8 tablets once weekly. -     hydroxychloroquine (PLAQUENIL) 200 MG tablet; Take 1 tablet by mouth 2 times daily  -     CBC Auto Differential; Future  -     Comprehensive Metabolic Panel; Future  -     TSH without Reflex; Future  -     Sedimentation Rate; Future  Stable. Patient is still in search of a rheumatologist. Patient reports that she'll need to see a specialist out of town due to her insurance. She is tolerating current medications well without a significant issue. Patient is on methotrexate and hydroxychloroquine. Patient is due for repeat blood work regards to these issues. Patient is to have CBC and CMP performed every 3-4 months at a min. Anxiety  -     sertraline (ZOLOFT) 50 MG tablet;  Take 1 tablet by mouth daily  Stable. No SI/HI. Tolerating medication well without issues. Patient was seeing psychiatry for a short period of time. Patient was placed on a antipsychotic medication which she did not tolerate and discontinued. Not regularly following with psychiatry    Vitamin D deficiency  -     Vitamin D 25 Hydroxy; Future    Ventral hernia without obstruction or gangrene  No issues. Gastroesophageal reflux disease, unspecified whether esophagitis present  Stable with OTC medications    Class 3 severe obesity due to excess calories with serious comorbidity and body mass index (BMI) of 40.0 to 44.9 in adult [facility])  Lifestyle modification advisable. Mixed hyperlipidemia  -     Lipid Panel; Future  -     TSH without Reflex; Future  Slightly elevated with last round of labs. Lifestyle modification advisable. Repeat labs    Right lumbar radiculopathy  -     XR LUMBAR SPINE (MIN 4 VIEWS); Future  Patient reports symptoms primarily in the right hip but with radicular symptoms down the lateral aspect of her leg with some numbness in her right foot. Concerns for possible degenerative disc disease and nerve impingement in the lower back. Check x-ray. Review HM at physical in 6 months. Patient should complete CBC and CMP every 3 months. Return in about 6 months (around 8/2/2022) for Physical.    An PureHistoryignature was used to authenticate this note.     --Bull Marc MD on 2/2/22 at 8:34 AM EST

## 2022-03-28 ENCOUNTER — OFFICE VISIT (OUTPATIENT)
Dept: FAMILY MEDICINE CLINIC | Age: 42
End: 2022-03-28
Payer: MEDICAID

## 2022-03-28 VITALS
DIASTOLIC BLOOD PRESSURE: 84 MMHG | TEMPERATURE: 97.6 F | SYSTOLIC BLOOD PRESSURE: 130 MMHG | WEIGHT: 293 LBS | HEART RATE: 90 BPM | OXYGEN SATURATION: 99 % | BODY MASS INDEX: 46.99 KG/M2

## 2022-03-28 DIAGNOSIS — R30.0 DYSURIA: ICD-10-CM

## 2022-03-28 DIAGNOSIS — Z20.2 POSSIBLE EXPOSURE TO STD: ICD-10-CM

## 2022-03-28 DIAGNOSIS — R30.0 DYSURIA: Primary | ICD-10-CM

## 2022-03-28 LAB
BILIRUBIN, POC: NORMAL
BLOOD URINE, POC: NORMAL
CLARITY, POC: YELLOW
COLOR, POC: CLEAR
GLUCOSE URINE, POC: NORMAL
KETONES, POC: NORMAL
LEUKOCYTE EST, POC: NORMAL
NITRITE, POC: NORMAL
PH, POC: 5
PROTEIN, POC: NORMAL
SPECIFIC GRAVITY, POC: 1.01
UROBILINOGEN, POC: 0.2

## 2022-03-28 PROCEDURE — 99213 OFFICE O/P EST LOW 20 MIN: CPT | Performed by: STUDENT IN AN ORGANIZED HEALTH CARE EDUCATION/TRAINING PROGRAM

## 2022-03-28 PROCEDURE — 4004F PT TOBACCO SCREEN RCVD TLK: CPT | Performed by: STUDENT IN AN ORGANIZED HEALTH CARE EDUCATION/TRAINING PROGRAM

## 2022-03-28 PROCEDURE — 81002 URINALYSIS NONAUTO W/O SCOPE: CPT | Performed by: STUDENT IN AN ORGANIZED HEALTH CARE EDUCATION/TRAINING PROGRAM

## 2022-03-28 PROCEDURE — G8417 CALC BMI ABV UP PARAM F/U: HCPCS | Performed by: STUDENT IN AN ORGANIZED HEALTH CARE EDUCATION/TRAINING PROGRAM

## 2022-03-28 PROCEDURE — G8427 DOCREV CUR MEDS BY ELIG CLIN: HCPCS | Performed by: STUDENT IN AN ORGANIZED HEALTH CARE EDUCATION/TRAINING PROGRAM

## 2022-03-28 PROCEDURE — G8484 FLU IMMUNIZE NO ADMIN: HCPCS | Performed by: STUDENT IN AN ORGANIZED HEALTH CARE EDUCATION/TRAINING PROGRAM

## 2022-03-28 ASSESSMENT — ENCOUNTER SYMPTOMS
NAUSEA: 0
VOMITING: 0
ABDOMINAL PAIN: 0
RHINORRHEA: 0
COUGH: 0
SHORTNESS OF BREATH: 0

## 2022-03-28 NOTE — PROGRESS NOTES
Clementina Waggoner (:  1980) is a 39 y.o. female,Established patient, here for evaluation of the following chief complaint(s):  Urinary Tract Infection and Sexually Transmitted Diseases         ASSESSMENT/PLAN:  1. Dysuria  -     POCT Urinalysis no Micro  -     C.TRACHOMATIS Maddi Moya; Future  -     Culture, Urine; Future  2. Possible exposure to STD  -     RPR; Future  -     HIV Screen; Future  -     Hepatitis C Antibody; Future    Will screen for STDs, UA was showing leukocytes and blood. Will send for culture. Labs as above. Since she is asymptomatic I will forgo empiric treatment. I advised her to follow up with her PCP quickly. Discussed return and ER precautions. Patient and or parent verbalized understanding      Return if symptoms worsen or fail to improve. Subjective   SUBJECTIVE/OBJECTIVE:  Patient presents today for STD screening  -she is asymptomatic  -no discahrge  -no urinary complaints  -maybe a slight itch that is not much different from baseline  -no fever  -no abdominal pain  -no unintentional weight loss  -she had a tubal ligation      Review of Systems   Constitutional: Negative for chills and fever. HENT: Negative for congestion and rhinorrhea. Respiratory: Negative for cough and shortness of breath. Cardiovascular: Negative for chest pain and leg swelling. Gastrointestinal: Negative for abdominal pain, nausea and vomiting. Genitourinary: Negative for dysuria and hematuria. Musculoskeletal: Negative for arthralgias and myalgias. Skin: Negative for rash and wound. Neurological: Negative for dizziness and light-headedness. Objective   Physical Exam  Vitals reviewed. Constitutional:       General: She is not in acute distress. HENT:      Head: Normocephalic and atraumatic. Eyes:      Extraocular Movements: Extraocular movements intact.       Conjunctiva/sclera: Conjunctivae normal.   Cardiovascular:      Rate and Rhythm: Normal rate and regular rhythm. Pulmonary:      Effort: Pulmonary effort is normal.      Breath sounds: Normal breath sounds. No wheezing. Abdominal:      General: Abdomen is flat. There is no distension. Musculoskeletal:         General: No tenderness or deformity. Neurological:      General: No focal deficit present. Mental Status: She is alert and oriented to person, place, and time. An electronic signature was used to authenticate this note.     --Henry Ryan MD

## 2022-03-29 LAB
HEPATITIS C ANTIBODY INTERPRETATION: NORMAL
HIV-1 AND HIV-2 ANTIBODIES: NORMAL
RPR: NORMAL

## 2022-03-31 LAB
C. TRACHOMATIS DNA ,URINE: NEGATIVE
N. GONORRHOEAE DNA, URINE: NEGATIVE
SOURCE: NORMAL
URINE CULTURE, ROUTINE: NORMAL

## 2022-04-04 ENCOUNTER — OFFICE VISIT (OUTPATIENT)
Dept: FAMILY MEDICINE CLINIC | Age: 42
End: 2022-04-04
Payer: MEDICAID

## 2022-04-04 VITALS
SYSTOLIC BLOOD PRESSURE: 130 MMHG | BODY MASS INDEX: 47.3 KG/M2 | OXYGEN SATURATION: 95 % | TEMPERATURE: 97.7 F | WEIGHT: 293 LBS | HEART RATE: 80 BPM | DIASTOLIC BLOOD PRESSURE: 88 MMHG

## 2022-04-04 DIAGNOSIS — J40 BRONCHITIS: Primary | ICD-10-CM

## 2022-04-04 PROCEDURE — 4004F PT TOBACCO SCREEN RCVD TLK: CPT | Performed by: STUDENT IN AN ORGANIZED HEALTH CARE EDUCATION/TRAINING PROGRAM

## 2022-04-04 PROCEDURE — G8417 CALC BMI ABV UP PARAM F/U: HCPCS | Performed by: STUDENT IN AN ORGANIZED HEALTH CARE EDUCATION/TRAINING PROGRAM

## 2022-04-04 PROCEDURE — G8427 DOCREV CUR MEDS BY ELIG CLIN: HCPCS | Performed by: STUDENT IN AN ORGANIZED HEALTH CARE EDUCATION/TRAINING PROGRAM

## 2022-04-04 PROCEDURE — 99213 OFFICE O/P EST LOW 20 MIN: CPT | Performed by: STUDENT IN AN ORGANIZED HEALTH CARE EDUCATION/TRAINING PROGRAM

## 2022-04-04 RX ORDER — PREDNISONE 20 MG/1
40 TABLET ORAL DAILY
Qty: 10 TABLET | Refills: 0 | Status: SHIPPED | OUTPATIENT
Start: 2022-04-04 | End: 2022-04-09

## 2022-04-04 ASSESSMENT — ENCOUNTER SYMPTOMS
VOMITING: 0
NAUSEA: 0
ABDOMINAL PAIN: 0
COUGH: 1
SHORTNESS OF BREATH: 0
SINUS PRESSURE: 1

## 2022-04-04 NOTE — PROGRESS NOTES
Tom Marcano (:  1980) is a 39 y.o. female,Established patient, here for evaluation of the following chief complaint(s):  Congestion, Pharyngitis, and Foot Pain (rt foot, limping, started yesterday)         ASSESSMENT/PLAN:  1. Bronchitis  -     predniSONE (DELTASONE) 20 MG tablet; Take 2 tablets by mouth daily for 5 days, Disp-10 tablet, R-0Normal  If not improving will consider baterial sinusitis and treat with antibiotics later in the week. Discussed return and ER precautions. Patient and or parent verbalized understanding. She is in agreement with the plan. No follow-ups on file. Subjective   SUBJECTIVE/OBJECTIVE:  Congestion, sore throat  -symptoms started yesterda  -cough  -wondering if she had a fever last night  -fatigued  -she has sinus pressure  -she has not taken any medication at home  -she is not short of breath  -her L ear is sore  -she is a current smoker      Review of Systems   Constitutional: Negative for chills and fever. HENT: Positive for congestion, ear pain and sinus pressure. Respiratory: Positive for cough. Negative for shortness of breath. Cardiovascular: Negative for chest pain and leg swelling. Gastrointestinal: Negative for abdominal pain, nausea and vomiting. Genitourinary: Negative for dysuria and hematuria. Musculoskeletal: Negative for arthralgias and myalgias. Skin: Negative for rash and wound. Neurological: Negative for dizziness and light-headedness. Objective   Physical Exam  Vitals reviewed. Constitutional:       General: She is not in acute distress. HENT:      Head: Normocephalic and atraumatic. Right Ear: Tympanic membrane is not erythematous or bulging. Left Ear: Tympanic membrane is erythematous. Tympanic membrane is not bulging. Mouth/Throat:      Pharynx: Posterior oropharyngeal erythema present. No oropharyngeal exudate. Eyes:      Extraocular Movements: Extraocular movements intact. Conjunctiva/sclera: Conjunctivae normal.   Cardiovascular:      Rate and Rhythm: Normal rate and regular rhythm. Pulmonary:      Effort: Pulmonary effort is normal.      Breath sounds: Normal breath sounds. No wheezing. Abdominal:      General: Abdomen is flat. There is no distension. Musculoskeletal:         General: No tenderness or deformity. Neurological:      General: No focal deficit present. Mental Status: She is alert and oriented to person, place, and time. An electronic signature was used to authenticate this note.     --Charles Garcia MD

## 2022-04-27 DIAGNOSIS — M05.79 RHEUMATOID ARTHRITIS INVOLVING MULTIPLE SITES WITH POSITIVE RHEUMATOID FACTOR (HCC): ICD-10-CM

## 2022-04-27 RX ORDER — MELOXICAM 15 MG/1
15 TABLET ORAL DAILY
Qty: 90 TABLET | Refills: 1 | Status: SHIPPED
Start: 2022-04-27 | End: 2022-09-12 | Stop reason: ALTCHOICE

## 2022-04-27 NOTE — TELEPHONE ENCOUNTER
----- Message from Beto Moffett sent at 4/26/2022  4:36 PM EDT -----  Subject: Message to Provider    QUESTIONS  Information for Provider? Patient needs meloxicam (MOBIC) 15 MG tablet and   Vitamin D tablet   ---------------------------------------------------------------------------  --------------  CALL BACK INFO  What is the best way for the office to contact you? OK to leave message on   voicemail  Preferred Call Back Phone Number? [phone]  ---------------------------------------------------------------------------  --------------  SCRIPT ANSWERS  Relationship to Patient?  Self

## 2022-05-07 ENCOUNTER — OFFICE VISIT (OUTPATIENT)
Dept: FAMILY MEDICINE CLINIC | Age: 42
End: 2022-05-07
Payer: MEDICAID

## 2022-05-07 VITALS
BODY MASS INDEX: 45.99 KG/M2 | HEIGHT: 67 IN | OXYGEN SATURATION: 97 % | HEART RATE: 66 BPM | TEMPERATURE: 98 F | WEIGHT: 293 LBS

## 2022-05-07 DIAGNOSIS — K52.9 ACUTE GASTROENTERITIS: Primary | ICD-10-CM

## 2022-05-07 PROCEDURE — 4004F PT TOBACCO SCREEN RCVD TLK: CPT | Performed by: FAMILY MEDICINE

## 2022-05-07 PROCEDURE — G8417 CALC BMI ABV UP PARAM F/U: HCPCS | Performed by: FAMILY MEDICINE

## 2022-05-07 PROCEDURE — 99213 OFFICE O/P EST LOW 20 MIN: CPT | Performed by: FAMILY MEDICINE

## 2022-05-07 PROCEDURE — G8427 DOCREV CUR MEDS BY ELIG CLIN: HCPCS | Performed by: FAMILY MEDICINE

## 2022-05-07 PROCEDURE — 96372 THER/PROPH/DIAG INJ SC/IM: CPT | Performed by: FAMILY MEDICINE

## 2022-05-07 RX ORDER — PROMETHAZINE HYDROCHLORIDE 25 MG/ML
25 INJECTION, SOLUTION INTRAMUSCULAR; INTRAVENOUS ONCE
Status: SHIPPED | OUTPATIENT
Start: 2022-05-07

## 2022-05-07 RX ORDER — PROMETHAZINE HYDROCHLORIDE 50 MG/ML
25 INJECTION, SOLUTION INTRAMUSCULAR; INTRAVENOUS EVERY 6 HOURS PRN
Status: SHIPPED | OUTPATIENT
Start: 2022-05-07

## 2022-05-07 RX ADMIN — PROMETHAZINE HYDROCHLORIDE 25 MG: 50 INJECTION, SOLUTION INTRAMUSCULAR; INTRAVENOUS at 12:05

## 2022-05-07 NOTE — PROGRESS NOTES
OFFICE NOTE    5/7/22  Name: Peggy Tolbert  GSV:2/13/[birth year]6   Sex:female   Age:41 y.o. SUBJECTIVE  Chief Complaint   Patient presents with    Nausea & Vomiting     started yesterday and continued into today; also has a headache and her stomach hurts       HPI had [place] food. No one else affected. Diarrhea only today. Feels like might still throw up. Review of Systems   No fever. No hematemasis, melena. No syncope or orthostatic symptoms. Urine looks concentrated      Current Outpatient Medications:     meloxicam (MOBIC) 15 MG tablet, Take 1 tablet by mouth daily, Disp: 90 tablet, Rfl: 1    vitamin D-3 (CHOLECALCIFEROL) 125 MCG (5000 UT) TABS, Take 1 tablet by mouth daily, Disp: 90 tablet, Rfl: 1    methotrexate (RHEUMATREX) 2.5 MG chemo tablet, Take 8 tablets once weekly. , Disp: 32 tablet, Rfl: 2    hydroxychloroquine (PLAQUENIL) 200 MG tablet, Take 1 tablet by mouth 2 times daily, Disp: 60 tablet, Rfl: 2    sertraline (ZOLOFT) 50 MG tablet, Take 1 tablet by mouth daily, Disp: 30 tablet, Rfl: 2    Cholecalciferol (HM VITAMIN D3) 100 MCG (4000 UT) CAPS, Take 4,000 Units by mouth daily, Disp: 90 capsule, Rfl: 3    folic acid (FOLVITE) 1 MG tablet, take 1 tablet by mouth once daily, Disp: 90 tablet, Rfl: 1  Allergies   Allergen Reactions    Penicillins     Cefaclor Hives     UNKNOWN REACTION  Other reaction(s): Hives    Codeine Hives     Other reaction(s): Hives  Other reaction(s): Hives    Erythromycin Rash     Other reaction(s): Hives    Tylenol 8 Hour [Acetaminophen] Hives     Tylenol #3 with Codeine       Past Medical History:   Diagnosis Date    Anxiety     Depression     Lipoma of arm     Lipoma of back     Rheumatoid arthritis (HCC)     Rheumatoid arthritis ([facility] [address] 2018     Past Surgical History:   Procedure Laterality Date    ANKLE FRACTURE SURGERY Right     has plate and pins    ARM SURGERY Left 7/19/2021    EXCISION LEFT ELBOW LESION performed by Samaria Jasmine MD at [facility] BACK SURGERY N/A 7/19/2021    EXCISION BACK LIPOMA performed by Richard Joe MD at [facility]       Family History   Problem Relation Age of Onset    Hypertension Mother      Social History     Tobacco History     Smoking Status  Current Every Day Smoker Smoking Frequency  1 pack/day for 2 years (2 pk yrs) Smoking Tobacco Type  Cigarettes    Smokeless Tobacco Use  Never Used          Alcohol History     Alcohol Use Status  No          Drug Use     Drug Use Status  Never          Sexual Activity     Sexually Active  Yes Partners  Male                OBJECTIVE  Vitals:    05/07/22 1130   Pulse: 66   Temp: 98 °F (36.7 °C)   TempSrc: Temporal   SpO2: 97%   Weight: (!) 302 lb (137 kg)   Height: 5' 7\" (1.702 m)        Body mass index is 47.3 kg/m². No orders of the defined types were placed in this encounter. EXAM   Physical Exam  Vitals and nursing note reviewed. Constitutional:       Appearance: Normal appearance. She is obese. HENT:      Right Ear: Tympanic membrane and external ear normal.      Left Ear: Tympanic membrane and external ear normal.      Nose: Congestion present. Mouth/Throat:      Pharynx: Oropharynx is clear. No posterior oropharyngeal erythema. Comments: Oral mucosa sticky  Eyes:      Conjunctiva/sclera: Conjunctivae normal.      Pupils: Pupils are equal, round, and reactive to light. Cardiovascular:      Rate and Rhythm: Normal rate and regular rhythm. Heart sounds: No murmur heard. Pulmonary:      Effort: Pulmonary effort is normal.      Breath sounds: Normal breath sounds. Abdominal:      General: Bowel sounds are normal.      Palpations: There is no mass. Tenderness: There is abdominal tenderness. There is no guarding or rebound. Musculoskeletal:      Cervical back: No tenderness. Lymphadenopathy:      Cervical: No cervical adenopathy. Skin:     Coloration: Skin is not jaundiced. Findings: No bruising or rash. Neurological:      General: No focal deficit present. Mental Status: She is alert and oriented to person, place, and time. [facility] was [facility] today for nausea & vomiting. Diagnoses and all orders for this visit:    Acute gastroenteritis  -     promethazine (PHENERGAN) injection 25 mg    Other orders  -     promethazine (PHENERGAN) injection 25 mg    Clear liquids plus gatoraide. BRATTY diet today. Increase to low fat no caffeine or lactose for 3 days. Warning signs discussed      Return if symptoms worsen or fail to improve.     Electronically signed by Robyn Camilo MD on 5/7/22 at 11:40 AM EDT

## 2022-05-16 ENCOUNTER — TELEMEDICINE (OUTPATIENT)
Dept: PRIMARY CARE CLINIC | Age: 42
End: 2022-05-16
Payer: MEDICAID

## 2022-05-16 DIAGNOSIS — Z09 HOSPITAL DISCHARGE FOLLOW-UP: Primary | ICD-10-CM

## 2022-05-16 DIAGNOSIS — M25.551 RIGHT HIP PAIN: ICD-10-CM

## 2022-05-16 DIAGNOSIS — M54.10 RADICULAR PAIN: ICD-10-CM

## 2022-05-16 PROCEDURE — 99213 OFFICE O/P EST LOW 20 MIN: CPT | Performed by: FAMILY MEDICINE

## 2022-05-16 PROCEDURE — G8427 DOCREV CUR MEDS BY ELIG CLIN: HCPCS | Performed by: FAMILY MEDICINE

## 2022-05-16 PROCEDURE — 1111F DSCHRG MED/CURRENT MED MERGE: CPT | Performed by: FAMILY MEDICINE

## 2022-05-16 RX ORDER — ORPHENADRINE CITRATE 100 MG/1
100 TABLET, EXTENDED RELEASE ORAL 2 TIMES DAILY
Qty: 20 TABLET | Refills: 0 | Status: SHIPPED | OUTPATIENT
Start: 2022-05-16 | End: 2022-05-26

## 2022-05-16 RX ORDER — PREDNISONE 10 MG/1
TABLET ORAL
Qty: 7 TABLET | Refills: 0 | Status: SHIPPED | OUTPATIENT
Start: 2022-05-16 | End: 2022-05-21

## 2022-05-16 ASSESSMENT — ENCOUNTER SYMPTOMS
SHORTNESS OF BREATH: 0
SORE THROAT: 0
VOMITING: 0
CONSTIPATION: 0
DIARRHEA: 0
RHINORRHEA: 0
NAUSEA: 0
ABDOMINAL PAIN: 0
BACK PAIN: 1

## 2022-05-16 NOTE — PROGRESS NOTES
Post-Discharge Transitional Care Follow Up      Peggy Tolbert   YOB: 1980    Date of Office Visit:  5/16/2022  Date of Hospital Admission: 7/19/21  Date of Hospital Discharge: 7/19/21  Readmission Risk Score(high >=14%. Medium >=10%):No data recorded    Care management risk score Rising risk (score 2-5) and Complex Care (Scores >=6): 1     Non face to face  following discharge, date last encounter closed (first attempt may have been earlier): *No documented post hospital discharge outreach found in the last 14 days     Call initiated 2 business days of discharge: *No response recorded in the last 14 days     Below is the assessment and plan developed based on review of pertinent history, physical exam, labs, studies, and medications. Hospital discharge follow-up  -     MT DISCHARGE MEDS RECONCILED W/ CURRENT OUTPATIENT MED LIST  Right hip pain  -     orphenadrine (NORFLEX) 100 MG extended release tablet; Take 1 tablet by mouth 2 times daily for 10 days, Disp-20 tablet, R-0Normal  -     predniSONE (DELTASONE) 10 MG tablet; Take 2 tablets by mouth daily for 2 days, THEN 1 tablet daily for 3 days. , Disp-7 tablet, R-0Normal  -     External Referral To Physical Therapy  Radicular pain  -     orphenadrine (NORFLEX) 100 MG extended release tablet; Take 1 tablet by mouth 2 times daily for 10 days, Disp-20 tablet, R-0Normal  -     predniSONE (DELTASONE) 10 MG tablet; Take 2 tablets by mouth daily for 2 days, THEN 1 tablet daily for 3 days. , Disp-7 tablet, R-0Normal  -     External Referral To Physical Therapy    Will extend the patient's steroids. Continue other medications. Add muscle relaxer. Patient has tolerated this well in the past.  Side effects reviewed. Patient advised on use such when she needs to be awake and focused or driving. All questions and concerns answered. Referral to physical therapy.   If no continued improvement in her symptoms or any worsening symptoms patient to return to Medications patient taking as of now reconciled against medications ordered at time of hospital discharge: Yes    Review of Systems   Constitutional: Negative for chills and fever. HENT: Negative for congestion, rhinorrhea and sore throat. Respiratory: Negative for shortness of breath. Cardiovascular: Negative for chest pain. Gastrointestinal: Negative for abdominal pain, constipation, diarrhea, nausea and vomiting. Genitourinary: Negative for dysuria. Musculoskeletal: Positive for arthralgias, back pain, gait problem and myalgias. Skin: Negative for rash. Neurological: Positive for numbness. Negative for light-headedness and headaches. Objective:    Patient-Reported Vitals  Patient-Reported Weight: 292 lb  Patient-Reported Height: 5 7      Physical Exam  Constitutional:       General: She is not in acute distress. Appearance: She is obese. Eyes:      Extraocular Movements: Extraocular movements intact. Conjunctiva/sclera: Conjunctivae normal.   Pulmonary:      Effort: No respiratory distress. Skin:     Findings: No rash. Neurological:      Mental Status: She is alert and oriented to person, place, and time. Psychiatric:         Mood and Affect: Mood normal.         Judgment: Judgment normal.             Laine GOODWIN Sly, was evaluated through a synchronous (real-time) audio-video encounter. The patient (or guardian if applicable) is aware that this is a billable service, which includes applicable co-pays. This Virtual Visit was conducted with patient's (and/or legal guardian's) consent. The visit was conducted pursuant to the emergency declaration under the [facility], [address] [place] [address] authority and the [employer] General Act. Patient identification was verified, and a caregiver was present when appropriate.  The patient was located at home in a state where the provider was licensed to provide care.     An electronic signature was used to authenticate this note.   --Rocio Espinoza MD

## 2022-05-29 DIAGNOSIS — M05.79 RHEUMATOID ARTHRITIS INVOLVING MULTIPLE SITES WITH POSITIVE RHEUMATOID FACTOR (HCC): ICD-10-CM

## 2022-05-31 DIAGNOSIS — M05.79 RHEUMATOID ARTHRITIS INVOLVING MULTIPLE SITES WITH POSITIVE RHEUMATOID FACTOR (HCC): ICD-10-CM

## 2022-05-31 RX ORDER — HYDROXYCHLOROQUINE SULFATE 200 MG/1
TABLET, FILM COATED ORAL
Qty: 180 TABLET | Refills: 0 | Status: SHIPPED
Start: 2022-05-31 | End: 2022-10-10

## 2022-06-06 ENCOUNTER — TELEPHONE (OUTPATIENT)
Dept: PRIMARY CARE CLINIC | Age: 42
End: 2022-06-06

## 2022-07-11 ENCOUNTER — OFFICE VISIT (OUTPATIENT)
Dept: FAMILY MEDICINE CLINIC | Age: 42
End: 2022-07-11
Payer: MEDICAID

## 2022-07-11 VITALS
RESPIRATION RATE: 18 BRPM | SYSTOLIC BLOOD PRESSURE: 128 MMHG | TEMPERATURE: 98.5 F | BODY MASS INDEX: 45.99 KG/M2 | HEART RATE: 101 BPM | DIASTOLIC BLOOD PRESSURE: 84 MMHG | WEIGHT: 293 LBS | HEIGHT: 67 IN | OXYGEN SATURATION: 98 %

## 2022-07-11 DIAGNOSIS — U07.1 COVID-19: ICD-10-CM

## 2022-07-11 DIAGNOSIS — R09.81 HEAD CONGESTION: Primary | ICD-10-CM

## 2022-07-11 LAB
Lab: ABNORMAL
PERFORMING INSTRUMENT: ABNORMAL
QC PASS/FAIL: ABNORMAL
SARS-COV-2, POC: DETECTED

## 2022-07-11 PROCEDURE — 99213 OFFICE O/P EST LOW 20 MIN: CPT | Performed by: STUDENT IN AN ORGANIZED HEALTH CARE EDUCATION/TRAINING PROGRAM

## 2022-07-11 PROCEDURE — G8427 DOCREV CUR MEDS BY ELIG CLIN: HCPCS | Performed by: STUDENT IN AN ORGANIZED HEALTH CARE EDUCATION/TRAINING PROGRAM

## 2022-07-11 PROCEDURE — G8417 CALC BMI ABV UP PARAM F/U: HCPCS | Performed by: STUDENT IN AN ORGANIZED HEALTH CARE EDUCATION/TRAINING PROGRAM

## 2022-07-11 PROCEDURE — 87426 SARSCOV CORONAVIRUS AG IA: CPT | Performed by: STUDENT IN AN ORGANIZED HEALTH CARE EDUCATION/TRAINING PROGRAM

## 2022-07-11 PROCEDURE — 4004F PT TOBACCO SCREEN RCVD TLK: CPT | Performed by: STUDENT IN AN ORGANIZED HEALTH CARE EDUCATION/TRAINING PROGRAM

## 2022-07-11 ASSESSMENT — ENCOUNTER SYMPTOMS
ABDOMINAL PAIN: 0
SORE THROAT: 1
COUGH: 0
RHINORRHEA: 1
NAUSEA: 0
VOMITING: 0
SHORTNESS OF BREATH: 0

## 2022-07-11 NOTE — PROGRESS NOTES
Christy Zhao (:  1980) is a 39 y.o. female,Established patient, here for evaluation of the following chief complaint(s):  Nausea (onset of symptoms began friday evening ), Nasal Congestion, Chills, and Headache         ASSESSMENT/PLAN:  1. Head congestion  -     POCT COVID-19, Antigen  2. COVID-19    Given obesity + methotrexate use we will rx paxlovid. Advised to go to ER immediately with increasing SOB issues. Contact precautions discussed and work note given. Tylenol for aches, pains, chill. Force fluids. Discussed return and ER precautions. Patient and or parent verbalized understanding      Return if symptoms worsen or fail to improve. Subjective   SUBJECTIVE/OBJECTIVE:  Headache, nausea, congestion, sore throat  -started Friday, worsening throughout the weekend  -no SOB, has a cough  -tested +ve for COVID today in office  -tolerating food and liquids  -has not taken any medication at home for her current symptoms  -she takes methotrexate for RA      Review of Systems   Constitutional: Positive for chills and fatigue. Negative for fever. HENT: Positive for congestion, rhinorrhea and sore throat. Respiratory: Negative for cough and shortness of breath. Cardiovascular: Negative for chest pain and leg swelling. Gastrointestinal: Negative for abdominal pain, nausea and vomiting. Genitourinary: Negative for dysuria and hematuria. Musculoskeletal: Positive for arthralgias and myalgias. Skin: Negative for rash and wound. Neurological: Positive for headaches. Negative for dizziness and light-headedness. Objective   Physical Exam  Vitals reviewed. Constitutional:       General: She is not in acute distress. Appearance: She is ill-appearing. HENT:      Head: Normocephalic and atraumatic. Eyes:      Extraocular Movements: Extraocular movements intact.       Conjunctiva/sclera: Conjunctivae normal.   Cardiovascular:      Rate and Rhythm: Normal rate and regular rhythm. Pulmonary:      Effort: Pulmonary effort is normal.      Breath sounds: Normal breath sounds. No wheezing. Abdominal:      General: Abdomen is flat. There is no distension. Musculoskeletal:         General: No tenderness or deformity. Neurological:      General: No focal deficit present. Mental Status: She is alert and oriented to person, place, and time. An electronic signature was used to authenticate this note.     --Tesfaye Hayward MD

## 2022-07-11 NOTE — LETTER
[place] [address]  Phone: [phone]  Fax: [phone]    Keo Echevarria MD        July 11, 2022     Patient: Tim Garibay   YOB: 1980   Date of Visit: 7/11/2022       To Whom It May Concern:     Pipe Sandoval was diagnosed with COVID 7/11/22. She should not return until 7/13/22 as long as she is feeling improved and does not have a fever.       Sincerely,        Keo Echevarria MD

## 2022-07-12 ENCOUNTER — TELEPHONE (OUTPATIENT)
Dept: FAMILY MEDICINE CLINIC | Age: 42
End: 2022-07-12

## 2022-07-24 DIAGNOSIS — M05.79 RHEUMATOID ARTHRITIS INVOLVING MULTIPLE SITES WITH POSITIVE RHEUMATOID FACTOR (HCC): ICD-10-CM

## 2022-07-30 DIAGNOSIS — F41.9 ANXIETY: ICD-10-CM

## 2022-08-10 ENCOUNTER — OFFICE VISIT (OUTPATIENT)
Dept: PRIMARY CARE CLINIC | Age: 42
End: 2022-08-10
Payer: MEDICAID

## 2022-08-10 VITALS
DIASTOLIC BLOOD PRESSURE: 82 MMHG | WEIGHT: 293 LBS | BODY MASS INDEX: 47.3 KG/M2 | SYSTOLIC BLOOD PRESSURE: 128 MMHG | HEART RATE: 60 BPM | TEMPERATURE: 98.4 F | OXYGEN SATURATION: 97 %

## 2022-08-10 DIAGNOSIS — M05.79 RHEUMATOID ARTHRITIS INVOLVING MULTIPLE SITES WITH POSITIVE RHEUMATOID FACTOR (HCC): Primary | ICD-10-CM

## 2022-08-10 DIAGNOSIS — F41.9 ANXIETY: ICD-10-CM

## 2022-08-10 DIAGNOSIS — E66.01 CLASS 3 SEVERE OBESITY DUE TO EXCESS CALORIES WITH SERIOUS COMORBIDITY AND BODY MASS INDEX (BMI) OF 40.0 TO 44.9 IN ADULT (HCC): ICD-10-CM

## 2022-08-10 DIAGNOSIS — M05.79 RHEUMATOID ARTHRITIS INVOLVING MULTIPLE SITES WITH POSITIVE RHEUMATOID FACTOR (HCC): ICD-10-CM

## 2022-08-10 DIAGNOSIS — E55.9 VITAMIN D DEFICIENCY: ICD-10-CM

## 2022-08-10 DIAGNOSIS — K21.9 GASTROESOPHAGEAL REFLUX DISEASE, UNSPECIFIED WHETHER ESOPHAGITIS PRESENT: ICD-10-CM

## 2022-08-10 DIAGNOSIS — B35.4 TINEA CORPORIS: ICD-10-CM

## 2022-08-10 LAB
ALBUMIN SERPL-MCNC: 3.7 G/DL (ref 3.5–5.2)
ALP BLD-CCNC: 83 U/L (ref 35–104)
ALT SERPL-CCNC: 8 U/L (ref 0–32)
ANION GAP SERPL CALCULATED.3IONS-SCNC: 10 MMOL/L (ref 7–16)
AST SERPL-CCNC: 10 U/L (ref 0–31)
BASOPHILS ABSOLUTE: 0.08 E9/L (ref 0–0.2)
BASOPHILS RELATIVE PERCENT: 0.8 % (ref 0–2)
BILIRUB SERPL-MCNC: <0.2 MG/DL (ref 0–1.2)
BUN BLDV-MCNC: 12 MG/DL (ref 6–20)
CALCIUM SERPL-MCNC: 9.4 MG/DL (ref 8.6–10.2)
CHLORIDE BLD-SCNC: 102 MMOL/L (ref 98–107)
CO2: 25 MMOL/L (ref 22–29)
CREAT SERPL-MCNC: 0.8 MG/DL (ref 0.5–1)
EOSINOPHILS ABSOLUTE: 0.35 E9/L (ref 0.05–0.5)
EOSINOPHILS RELATIVE PERCENT: 3.4 % (ref 0–6)
GFR AFRICAN AMERICAN: >60
GFR NON-AFRICAN AMERICAN: >60 ML/MIN/1.73
GLUCOSE BLD-MCNC: 108 MG/DL (ref 74–99)
HCT VFR BLD CALC: 40.3 % (ref 34–48)
HEMOGLOBIN: 13.1 G/DL (ref 11.5–15.5)
IMMATURE GRANULOCYTES #: 0.04 E9/L
IMMATURE GRANULOCYTES %: 0.4 % (ref 0–5)
LYMPHOCYTES ABSOLUTE: 1.83 E9/L (ref 1.5–4)
LYMPHOCYTES RELATIVE PERCENT: 18 % (ref 20–42)
MCH RBC QN AUTO: 29.2 PG (ref 26–35)
MCHC RBC AUTO-ENTMCNC: 32.5 % (ref 32–34.5)
MCV RBC AUTO: 90 FL (ref 80–99.9)
MONOCYTES ABSOLUTE: 0.68 E9/L (ref 0.1–0.95)
MONOCYTES RELATIVE PERCENT: 6.7 % (ref 2–12)
NEUTROPHILS ABSOLUTE: 7.21 E9/L (ref 1.8–7.3)
NEUTROPHILS RELATIVE PERCENT: 70.7 % (ref 43–80)
PDW BLD-RTO: 14.6 FL (ref 11.5–15)
PLATELET # BLD: 301 E9/L (ref 130–450)
PMV BLD AUTO: 9.7 FL (ref 7–12)
POTASSIUM SERPL-SCNC: 4.2 MMOL/L (ref 3.5–5)
RBC # BLD: 4.48 E12/L (ref 3.5–5.5)
SEDIMENTATION RATE, ERYTHROCYTE: 40 MM/HR (ref 0–20)
SODIUM BLD-SCNC: 137 MMOL/L (ref 132–146)
TOTAL PROTEIN: 7.4 G/DL (ref 6.4–8.3)
VITAMIN D 25-HYDROXY: 39 NG/ML (ref 30–100)
WBC # BLD: 10.2 E9/L (ref 4.5–11.5)

## 2022-08-10 PROCEDURE — 4004F PT TOBACCO SCREEN RCVD TLK: CPT | Performed by: FAMILY MEDICINE

## 2022-08-10 PROCEDURE — 99214 OFFICE O/P EST MOD 30 MIN: CPT | Performed by: FAMILY MEDICINE

## 2022-08-10 PROCEDURE — G8427 DOCREV CUR MEDS BY ELIG CLIN: HCPCS | Performed by: FAMILY MEDICINE

## 2022-08-10 PROCEDURE — G8417 CALC BMI ABV UP PARAM F/U: HCPCS | Performed by: FAMILY MEDICINE

## 2022-08-10 RX ORDER — CHOLECALCIFEROL (VITAMIN D3) 50 MCG
4000 CAPSULE ORAL DAILY
Qty: 90 CAPSULE | Refills: 3 | Status: SHIPPED | OUTPATIENT
Start: 2022-08-10

## 2022-08-10 RX ORDER — CLOTRIMAZOLE 1 %
CREAM (GRAM) TOPICAL
Qty: 40 G | Refills: 1 | Status: SHIPPED | OUTPATIENT
Start: 2022-08-10 | End: 2022-08-17

## 2022-08-10 ASSESSMENT — ENCOUNTER SYMPTOMS
RHINORRHEA: 0
DIARRHEA: 0
SORE THROAT: 0
WHEEZING: 0
ABDOMINAL PAIN: 0
CONSTIPATION: 0
VOMITING: 0
NAUSEA: 0
SHORTNESS OF BREATH: 0

## 2022-08-10 NOTE — PROGRESS NOTES
8/10/2022     Chief Complaint   Patient presents with    Follow-up     Yeast infection thinks its on skin on the right side    Discuss Medications     Vit d not covered since it changed 5000 units isn't covered but 4000 units is       HPI  Altaf Johnson (:  1980) is a 39 y.o. female, here for evaluation of the following medical concerns:    Patient is presenting today for a follow-up appointment in regards to her rheumatoid arthritis. Patient is currently on Plaquenil. However, patient has not been able to achieve significant relief of her chronic aches and pains. Has been restarted and titrated up on her methotrexate to 20 mg weekly with folic acid 1 mg daily. She has been on this medication in the past and the only side effect was mild thinning of her hair. She reports she has been tolerating this medication well. She did not obtain her repeat CBC and CMP. Patient needs to establish with a rheumatologist. Patient needs yearly eye exam.      Patient's PPD was negative prior to initiation of her medications. Patient needs CBC, CMP every 3 months     Ovarian cyst: Right side, resolved on recent US. No recent issues. Tubal Ligation Reversal:  Patient is yet to complete such. GERD: Stable off medications     Anxiety: Patient has been well controlled on Zoloft 50 mg daily. Patient denies any SI/HI. Follow psychiatry. Obesity: Patient does not regularly follow a diet or exercise regimen. Was referred to bariatrics in the past.      Ventral Hernia: No issues. Rash right lower abd. HM: PNA, Varicella titer. Review of Systems   Constitutional:  Negative for chills and fever. HENT:  Negative for congestion, rhinorrhea and sore throat. Respiratory:  Negative for shortness of breath and wheezing. Cardiovascular:  Negative for chest pain and leg swelling. Gastrointestinal:  Negative for abdominal pain, constipation, diarrhea, nausea and vomiting.    Musculoskeletal:  Positive for arthralgias and gait problem. Skin:  Positive for rash. Neurological:  Negative for light-headedness and headaches. Past Medical History:   Diagnosis Date    Anxiety     Depression     Lipoma of arm     Lipoma of back     Rheumatoid arthritis ([facility] [address]     Rheumatoid arthritis ([facility] 2018       Prior to Visit Medications    Medication Sig Taking? Authorizing Provider   sertraline (ZOLOFT) 50 MG tablet take 1 tablet by mouth once daily Yes Renetta Knight MD   Cholecalciferol (HM VITAMIN D3) 100 MCG (4000 UT) CAPS Take 4,000 Units by mouth daily Yes Renetta Knight MD   mupirocin (BACTROBAN) 2 % ointment Apply topically 3 times daily.  Yes Renetta Knight MD   clotrimazole (CLOTRIMAZOLE ANTI-FUNGAL) 1 % cream Apply topically 3 times daily for 14 days Yes Renetta Knight MD   methotrexate (RHEUMATREX) 2.5 MG chemo tablet take 8 tablets by mouth every week Yes Renetta Knight MD   hydroxychloroquine (PLAQUENIL) 200 mg tablet take 1 tablet by mouth twice a day Yes Renetta Knight MD   folic acid (FOLVITE) 1 MG tablet take 1 tablet by mouth once daily Yes Renetta Knight MD   meloxicam (MOBIC) 15 MG tablet Take 1 tablet by mouth daily  Renetta Knight MD        Allergies   Allergen Reactions    Penicillins     Cefaclor Hives     UNKNOWN REACTION  Other reaction(s): Hives    Codeine Hives     Other reaction(s): Hives  Other reaction(s): Hives    Erythromycin Rash     Other reaction(s): Hives    Tylenol 8 Hour [Acetaminophen] Hives     Tylenol #3 with Codeine       Social History     Tobacco Use    Smoking status: Every Day     Packs/day: 1.00     Years: 2.00     Pack years: 2.00     Types: Cigarettes    Smokeless tobacco: Never   Substance Use Topics    Alcohol use: No           Vitals:    08/10/22 1535   BP: 128/82   Pulse: 60   Temp: 98.4 °F (36.9 °C)   SpO2: 97%   Weight: (!) 302 lb (137 kg)     Estimated body mass index is 47.3 kg/m² as calculated from the following:    Height as of 7/11/22: 5' 7\" (1.702 m). Weight as of this encounter: 302 lb (137 kg). Physical Exam  Constitutional:       Appearance: She is well-developed. HENT:      Head: Normocephalic. Eyes:      Extraocular Movements: Extraocular movements intact. Conjunctiva/sclera: Conjunctivae normal.   Cardiovascular:      Rate and Rhythm: Normal rate and regular rhythm. Heart sounds: Normal heart sounds. No murmur heard. Pulmonary:      Effort: Pulmonary effort is normal.      Breath sounds: Normal breath sounds. No wheezing or rales. Abdominal:      General: Bowel sounds are normal.      Palpations: Abdomen is soft. Tenderness: There is no abdominal tenderness. Musculoskeletal:      Comments: Bilateral lower extremity lymphedema. Skin:     Comments: Fungal rash in the right lower abdomen/groin region. Neurological:      General: No focal deficit present. Mental Status: She is alert. Comments: Cranial nerves grossly intact   Psychiatric:         Mood and Affect: Mood normal.         Judgment: Judgment normal.       ASSESSMENT/PLAN:  Kemar Rosario was seen today for follow-up and discuss medications. Diagnoses and all orders for this visit:    Rheumatoid arthritis involving multiple sites with positive rheumatoid factor ([facility] [address]  -     CBC with Auto Differential; Future  -     Comprehensive Metabolic Panel; Future  -     Sedimentation Rate; Future  -     CBC with Auto Differential; Future  -     Comprehensive Metabolic Panel; Future  Slightly worse Patient is still in search of a rheumatologist. Patient reports that she'll need to see a specialist out of town due to her insurance. She is tolerating current medications well without a significant issue. Patient is on methotrexate and hydroxychloroquine. Patient is due for repeat blood work regards to these issues. Patient is to have CBC and CMP performed every 3-4 months at a min. Vitamin D deficiency  -     Vitamin D 25 Hydroxy;  Future  -     Cholecalciferol ( VITAMIN D3) 100 MCG (4000 UT) CAPS; Take 4,000 Units by mouth daily    Anxiety  -     sertraline (ZOLOFT) 50 MG tablet; take 1 tablet by mouth once daily  Stable. No SI/HI. Tolerating medication well without issues. Patient was seeing psychiatry for a short period of time. Patient was placed on a antipsychotic medication which she did not tolerate and discontinued. Not regularly following with psychiatry. Gastroesophageal reflux disease, unspecified whether esophagitis present  Stable off meds. Class 3 severe obesity due to excess calories with serious comorbidity and body mass index (BMI) of 40.0 to 44.9 in adult [facility])  Lifestyle modifications reviewed and advised. Tinea corporis  -     mupirocin (BACTROBAN) 2 % ointment; Apply topically 3 times daily. Other orders  -     clotrimazole (CLOTRIMAZOLE ANTI-FUNGAL) 1 % cream; Apply topically 3 times daily for 14 days  RTC if no improvement in rash. Use creams for 14-21 days. Return in about 6 months (around 2/10/2023) for Physical, To Discuss Labs Results. An [employer] was used to authenticate this note.     --Hudson Lara MD on 8/10/22 at 11:00 AM EDT

## 2022-08-11 DIAGNOSIS — M05.79 RHEUMATOID ARTHRITIS INVOLVING MULTIPLE SITES WITH POSITIVE RHEUMATOID FACTOR (HCC): Primary | ICD-10-CM

## 2022-08-24 DIAGNOSIS — M05.79 RHEUMATOID ARTHRITIS INVOLVING MULTIPLE SITES WITH POSITIVE RHEUMATOID FACTOR (HCC): ICD-10-CM

## 2022-09-12 DIAGNOSIS — M05.79 RHEUMATOID ARTHRITIS INVOLVING MULTIPLE SITES WITH POSITIVE RHEUMATOID FACTOR (HCC): Primary | ICD-10-CM

## 2022-09-12 RX ORDER — IBUPROFEN 600 MG/1
600 TABLET ORAL 3 TIMES DAILY PRN
Qty: 180 TABLET | Refills: 1 | Status: SHIPPED | OUTPATIENT
Start: 2022-09-12

## 2022-10-01 DIAGNOSIS — M05.79 RHEUMATOID ARTHRITIS INVOLVING MULTIPLE SITES WITH POSITIVE RHEUMATOID FACTOR (HCC): ICD-10-CM

## 2022-10-08 DIAGNOSIS — M05.79 RHEUMATOID ARTHRITIS INVOLVING MULTIPLE SITES WITH POSITIVE RHEUMATOID FACTOR (HCC): ICD-10-CM

## 2022-10-10 RX ORDER — HYDROXYCHLOROQUINE SULFATE 200 MG/1
TABLET, FILM COATED ORAL
Qty: 180 TABLET | Refills: 0 | Status: SHIPPED | OUTPATIENT
Start: 2022-10-10

## 2022-10-24 DIAGNOSIS — M05.79 RHEUMATOID ARTHRITIS INVOLVING MULTIPLE SITES WITH POSITIVE RHEUMATOID FACTOR (HCC): ICD-10-CM

## 2022-10-28 DIAGNOSIS — M05.79 RHEUMATOID ARTHRITIS INVOLVING MULTIPLE SITES WITH POSITIVE RHEUMATOID FACTOR (HCC): ICD-10-CM

## 2022-10-28 RX ORDER — MELOXICAM 15 MG/1
TABLET ORAL
Qty: 90 TABLET | Refills: 1 | OUTPATIENT
Start: 2022-10-28

## 2022-10-29 DIAGNOSIS — M05.79 RHEUMATOID ARTHRITIS INVOLVING MULTIPLE SITES WITH POSITIVE RHEUMATOID FACTOR (HCC): ICD-10-CM

## 2022-11-28 DIAGNOSIS — M05.79 RHEUMATOID ARTHRITIS INVOLVING MULTIPLE SITES WITH POSITIVE RHEUMATOID FACTOR (HCC): Primary | ICD-10-CM

## 2022-11-28 DIAGNOSIS — M05.79 RHEUMATOID ARTHRITIS INVOLVING MULTIPLE SITES WITH POSITIVE RHEUMATOID FACTOR (HCC): ICD-10-CM

## 2022-11-28 LAB
ALBUMIN SERPL-MCNC: 3.8 G/DL (ref 3.5–5.2)
ALP BLD-CCNC: 83 U/L (ref 35–104)
ALT SERPL-CCNC: 5 U/L (ref 0–32)
ANION GAP SERPL CALCULATED.3IONS-SCNC: 14 MMOL/L (ref 7–16)
AST SERPL-CCNC: 10 U/L (ref 0–31)
BASOPHILS ABSOLUTE: 0.12 E9/L (ref 0–0.2)
BASOPHILS RELATIVE PERCENT: 1.3 % (ref 0–2)
BILIRUB SERPL-MCNC: <0.2 MG/DL (ref 0–1.2)
BUN BLDV-MCNC: 16 MG/DL (ref 6–20)
CALCIUM SERPL-MCNC: 9.7 MG/DL (ref 8.6–10.2)
CHLORIDE BLD-SCNC: 103 MMOL/L (ref 98–107)
CO2: 21 MMOL/L (ref 22–29)
CREAT SERPL-MCNC: 0.8 MG/DL (ref 0.5–1)
EOSINOPHILS ABSOLUTE: 0.29 E9/L (ref 0.05–0.5)
EOSINOPHILS RELATIVE PERCENT: 3 % (ref 0–6)
GFR SERPL CREATININE-BSD FRML MDRD: >60 ML/MIN/1.73
GLUCOSE BLD-MCNC: 99 MG/DL (ref 74–99)
HCT VFR BLD CALC: 43.2 % (ref 34–48)
HEMOGLOBIN: 14.1 G/DL (ref 11.5–15.5)
IMMATURE GRANULOCYTES #: 0.04 E9/L
IMMATURE GRANULOCYTES %: 0.4 % (ref 0–5)
LYMPHOCYTES ABSOLUTE: 1.77 E9/L (ref 1.5–4)
LYMPHOCYTES RELATIVE PERCENT: 18.6 % (ref 20–42)
MCH RBC QN AUTO: 29.1 PG (ref 26–35)
MCHC RBC AUTO-ENTMCNC: 32.6 % (ref 32–34.5)
MCV RBC AUTO: 89.1 FL (ref 80–99.9)
MONOCYTES ABSOLUTE: 0.76 E9/L (ref 0.1–0.95)
MONOCYTES RELATIVE PERCENT: 8 % (ref 2–12)
NEUTROPHILS ABSOLUTE: 6.55 E9/L (ref 1.8–7.3)
NEUTROPHILS RELATIVE PERCENT: 68.7 % (ref 43–80)
PDW BLD-RTO: 14.3 FL (ref 11.5–15)
PLATELET # BLD: 335 E9/L (ref 130–450)
PMV BLD AUTO: 9.3 FL (ref 7–12)
POTASSIUM SERPL-SCNC: 4.9 MMOL/L (ref 3.5–5)
RBC # BLD: 4.85 E12/L (ref 3.5–5.5)
SODIUM BLD-SCNC: 138 MMOL/L (ref 132–146)
TOTAL PROTEIN: 7.2 G/DL (ref 6.4–8.3)
WBC # BLD: 9.5 E9/L (ref 4.5–11.5)

## 2022-11-28 NOTE — PROGRESS NOTES
Lab needing new dates on order even though it is from August. Per lab, \"cannot release anything after 6 weeks\". Updated lab order with todays date.

## 2022-11-29 DIAGNOSIS — M05.79 RHEUMATOID ARTHRITIS INVOLVING MULTIPLE SITES WITH POSITIVE RHEUMATOID FACTOR (HCC): ICD-10-CM

## 2022-11-30 RX ORDER — FOLIC ACID 1 MG/1
TABLET ORAL
Qty: 90 TABLET | Refills: 1 | Status: SHIPPED | OUTPATIENT
Start: 2022-11-30

## 2022-12-02 DIAGNOSIS — M05.79 RHEUMATOID ARTHRITIS INVOLVING MULTIPLE SITES WITH POSITIVE RHEUMATOID FACTOR (HCC): ICD-10-CM

## 2022-12-30 DIAGNOSIS — M05.79 RHEUMATOID ARTHRITIS INVOLVING MULTIPLE SITES WITH POSITIVE RHEUMATOID FACTOR (HCC): ICD-10-CM

## 2023-01-17 DIAGNOSIS — M05.79 RHEUMATOID ARTHRITIS INVOLVING MULTIPLE SITES WITH POSITIVE RHEUMATOID FACTOR (HCC): ICD-10-CM

## 2023-01-18 RX ORDER — MELOXICAM 15 MG/1
TABLET ORAL
Qty: 90 TABLET | Refills: 1 | Status: SHIPPED | OUTPATIENT
Start: 2023-01-18

## 2023-01-28 DIAGNOSIS — M05.79 RHEUMATOID ARTHRITIS INVOLVING MULTIPLE SITES WITH POSITIVE RHEUMATOID FACTOR (HCC): ICD-10-CM

## 2023-01-30 RX ORDER — HYDROXYCHLOROQUINE SULFATE 200 MG/1
TABLET, FILM COATED ORAL
Qty: 180 TABLET | Refills: 1 | Status: SHIPPED | OUTPATIENT
Start: 2023-01-30

## 2023-01-31 DIAGNOSIS — M05.79 RHEUMATOID ARTHRITIS INVOLVING MULTIPLE SITES WITH POSITIVE RHEUMATOID FACTOR (HCC): ICD-10-CM

## 2023-03-10 DIAGNOSIS — M05.79 RHEUMATOID ARTHRITIS INVOLVING MULTIPLE SITES WITH POSITIVE RHEUMATOID FACTOR (HCC): ICD-10-CM

## (undated) DEVICE — DOUBLE BASIN SET: Brand: MEDLINE INDUSTRIES, INC.

## (undated) DEVICE — TUBING SUCT 12FR MAL ALUM SHFT FN CAP VENT UNIV CONN W/ OBT

## (undated) DEVICE — INTENDED FOR TISSUE SEPARATION, AND OTHER PROCEDURES THAT REQUIRE A SHARP SURGICAL BLADE TO PUNCTURE OR CUT.: Brand: BARD-PARKER ® STAINLESS STEEL BLADES

## (undated) DEVICE — TRAY AMBULATORY REUSABLE

## (undated) DEVICE — SHEET, T, LAPAROTOMY, STERILE: Brand: MEDLINE

## (undated) DEVICE — PACK PROCEDURE SURG GEN CUST

## (undated) DEVICE — GLOVE SURG SZ 7 L12IN FNGR THK94MIL TRNSLUC YEL LTX HYDRGEL

## (undated) DEVICE — ADHESIVE SKIN CLSR 0.7ML TOP DERMBND ADV

## (undated) DEVICE — GOWN,SIRUS,FABRNF,L,20/CS: Brand: MEDLINE

## (undated) DEVICE — NEEDLE HYPO 25GA L1.5IN BLU POLYPR HUB S STL REG BVL STR

## (undated) DEVICE — COVER HNDL LT DISP

## (undated) DEVICE — GOWN,SIRUS,FABRNF,XL,20/CS: Brand: MEDLINE

## (undated) DEVICE — ELECTRODE PT RET AD L9FT HI MOIST COND ADH HYDRGEL CORDED

## (undated) DEVICE — CHLORAPREP 26ML ORANGE

## (undated) DEVICE — DRESSING COMP W4XL4IN N ADH PD W2.5XL2.5IN GZ BORDERED ADH

## (undated) DEVICE — SOLUTION IV IRRIG POUR BRL 0.9% SODIUM CHL 2F7124

## (undated) DEVICE — 4-PORT MANIFOLD: Brand: NEPTUNE 2

## (undated) DEVICE — CONTROL SYRINGE LUER-LOCK TIP: Brand: MONOJECT

## (undated) DEVICE — TOWEL,OR,DSP,ST,BLUE,STD,6/PK,12PK/CS: Brand: MEDLINE